# Patient Record
Sex: MALE | Race: BLACK OR AFRICAN AMERICAN | NOT HISPANIC OR LATINO | Employment: FULL TIME | URBAN - METROPOLITAN AREA
[De-identification: names, ages, dates, MRNs, and addresses within clinical notes are randomized per-mention and may not be internally consistent; named-entity substitution may affect disease eponyms.]

---

## 2017-09-26 ENCOUNTER — OFFICE VISIT (OUTPATIENT)
Dept: INTERNAL MEDICINE | Facility: CLINIC | Age: 37
End: 2017-09-26
Payer: COMMERCIAL

## 2017-09-26 VITALS
HEART RATE: 82 BPM | SYSTOLIC BLOOD PRESSURE: 118 MMHG | TEMPERATURE: 99 F | OXYGEN SATURATION: 96 % | BODY MASS INDEX: 34.4 KG/M2 | DIASTOLIC BLOOD PRESSURE: 70 MMHG | HEIGHT: 70 IN | WEIGHT: 240.31 LBS

## 2017-09-26 DIAGNOSIS — Z00.00 ROUTINE GENERAL MEDICAL EXAMINATION AT A HEALTH CARE FACILITY: Primary | ICD-10-CM

## 2017-09-26 DIAGNOSIS — M25.562 CHRONIC PAIN OF LEFT KNEE: ICD-10-CM

## 2017-09-26 DIAGNOSIS — K21.9 GASTROESOPHAGEAL REFLUX DISEASE, ESOPHAGITIS PRESENCE NOT SPECIFIED: ICD-10-CM

## 2017-09-26 DIAGNOSIS — G89.29 CHRONIC PAIN OF LEFT KNEE: ICD-10-CM

## 2017-09-26 PROCEDURE — 99385 PREV VISIT NEW AGE 18-39: CPT | Mod: S$GLB,,, | Performed by: INTERNAL MEDICINE

## 2017-09-26 PROCEDURE — 99999 PR PBB SHADOW E&M-EST. PATIENT-LVL III: CPT | Mod: PBBFAC,,, | Performed by: INTERNAL MEDICINE

## 2017-09-26 RX ORDER — ESOMEPRAZOLE MAGNESIUM 40 MG/1
40 CAPSULE, DELAYED RELEASE ORAL
Qty: 30 CAPSULE | Refills: 11 | Status: SHIPPED | OUTPATIENT
Start: 2017-09-26 | End: 2017-11-30 | Stop reason: SDUPTHER

## 2017-09-26 NOTE — PROGRESS NOTES
"HPI:  Patient is a 37-year-old man who comes in today for his initial visit myself to establish care.  Patient has a long history of esophageal reflux disease.  He states been about 20 years.  He currently uses when necessary over-the-counter Nexium.  Last time he had any formal evaluation was about 10 years ago.  Patient denies any trouble swallowing.  He otherwise has chronic left knee pain, also for about 15 years.  He denies any trauma or injury to the knee.  He states that whenever he is in a prolonged sitting position It begins to hurt.  He otherwise is doing well and has no other complaints      Current MEDS: medcard review, verified and update  Allergies: Per the electronic medical record    Past Medical History:   Diagnosis Date    GERD (gastroesophageal reflux disease)        History reviewed. No pertinent surgical history.    SHx: per the electronic medical record    FHx: recorded in the electronic medical record    ROS:    denies any chest pains or shortness of breath. Denies any nausea, vomiting or diarrhea. Denies any fever, chills or sweats. Denies any change in weight, voice, stool, skin or hair. Denies any dysuria, dyspepsia or dysphagia. Denies any change in vision, hearing or headaches. Denies any swollen lymph nodes or loss of memory.    PE:  /70   Pulse 82   Temp 98.5 °F (36.9 °C) (Tympanic)   Ht 5' 9.5" (1.765 m)   Wt 109 kg (240 lb 4.8 oz)   SpO2 96%   BMI 34.98 kg/m²   Gen: Well-developed, well-nourished, male, in no acute distress, oriented x3  HEENT: neck is supple, no adenopathy, carotids 2+ equal without bruits, thyroid exam normal size without nodules.  CHEST: clear to auscultation and percussion  CVS: regular rate and rhythm without significant murmur, gallop, or rubs  ABD: soft, benign, no rebound no guarding, no distention.  Bowel sounds are normal.     nontender.  No palpable masses.  No organomegaly and no audible bruits.  Joint: His left knee has no laxity in it.  " There is no effusion, no warmth or erythema.  He has full range of motion        Impression:  GERD ×20 years, currently symptomatic  Left knee pain, chronic  Patient Active Problem List   Diagnosis    GERD (gastroesophageal reflux disease)       Plan:   Orders Placed This Encounter    CBC auto differential    Comprehensive metabolic panel    Lipid panel    TSH    Ambulatory consult to Orthopedics    esomeprazole (NEXIUM) 40 MG capsule     He was started on Nexium 40 mg daily.  We discussed other means to help control his reflux disease.  Patient was told that if he is not 95% better in 2 months, he needs to call me.  He'll have the above lab work done and we will call him with results.  He was referred to see an orthopedist for his knee.

## 2017-10-04 ENCOUNTER — OFFICE VISIT (OUTPATIENT)
Dept: INTERNAL MEDICINE | Facility: CLINIC | Age: 37
End: 2017-10-04
Payer: COMMERCIAL

## 2017-10-04 ENCOUNTER — LAB VISIT (OUTPATIENT)
Dept: LAB | Facility: HOSPITAL | Age: 37
End: 2017-10-04
Attending: INTERNAL MEDICINE
Payer: COMMERCIAL

## 2017-10-04 VITALS
DIASTOLIC BLOOD PRESSURE: 68 MMHG | OXYGEN SATURATION: 96 % | SYSTOLIC BLOOD PRESSURE: 115 MMHG | WEIGHT: 238.56 LBS | HEIGHT: 70 IN | BODY MASS INDEX: 34.15 KG/M2 | TEMPERATURE: 99 F | HEART RATE: 84 BPM

## 2017-10-04 DIAGNOSIS — Z00.00 ROUTINE GENERAL MEDICAL EXAMINATION AT A HEALTH CARE FACILITY: ICD-10-CM

## 2017-10-04 DIAGNOSIS — H66.92 LEFT OTITIS MEDIA, UNSPECIFIED OTITIS MEDIA TYPE: ICD-10-CM

## 2017-10-04 DIAGNOSIS — J02.9 PHARYNGITIS, UNSPECIFIED ETIOLOGY: Primary | ICD-10-CM

## 2017-10-04 LAB
ALBUMIN SERPL BCP-MCNC: 3.9 G/DL
ALP SERPL-CCNC: 78 U/L
ALT SERPL W/O P-5'-P-CCNC: 26 U/L
ANION GAP SERPL CALC-SCNC: 8 MMOL/L
AST SERPL-CCNC: 34 U/L
BASOPHILS # BLD AUTO: 0.01 K/UL
BASOPHILS NFR BLD: 0.1 %
BILIRUB SERPL-MCNC: 0.7 MG/DL
BUN SERPL-MCNC: 14 MG/DL
CALCIUM SERPL-MCNC: 9.3 MG/DL
CHLORIDE SERPL-SCNC: 102 MMOL/L
CHOLEST SERPL-MCNC: 173 MG/DL
CHOLEST/HDLC SERPL: 4.4 {RATIO}
CO2 SERPL-SCNC: 26 MMOL/L
CREAT SERPL-MCNC: 1.1 MG/DL
CTP QC/QA: YES
DIFFERENTIAL METHOD: NORMAL
EOSINOPHIL # BLD AUTO: 0.1 K/UL
EOSINOPHIL NFR BLD: 0.6 %
ERYTHROCYTE [DISTWIDTH] IN BLOOD BY AUTOMATED COUNT: 13 %
EST. GFR  (AFRICAN AMERICAN): >60 ML/MIN/1.73 M^2
EST. GFR  (NON AFRICAN AMERICAN): >60 ML/MIN/1.73 M^2
GLUCOSE SERPL-MCNC: 82 MG/DL
HCT VFR BLD AUTO: 44.2 %
HDLC SERPL-MCNC: 39 MG/DL
HDLC SERPL: 22.5 %
HGB BLD-MCNC: 14.8 G/DL
LDLC SERPL CALC-MCNC: 117.4 MG/DL
LYMPHOCYTES # BLD AUTO: 2.9 K/UL
LYMPHOCYTES NFR BLD: 25.6 %
MCH RBC QN AUTO: 30 PG
MCHC RBC AUTO-ENTMCNC: 33.5 G/DL
MCV RBC AUTO: 90 FL
MONOCYTES # BLD AUTO: 0.8 K/UL
MONOCYTES NFR BLD: 7 %
NEUTROPHILS # BLD AUTO: 7.7 K/UL
NEUTROPHILS NFR BLD: 66.5 %
NONHDLC SERPL-MCNC: 134 MG/DL
PLATELET # BLD AUTO: 283 K/UL
PMV BLD AUTO: 11 FL
POTASSIUM SERPL-SCNC: 4.1 MMOL/L
PROT SERPL-MCNC: 8.1 G/DL
RBC # BLD AUTO: 4.93 M/UL
S PYO RRNA THROAT QL PROBE: NEGATIVE
SODIUM SERPL-SCNC: 136 MMOL/L
TRIGL SERPL-MCNC: 83 MG/DL
TSH SERPL DL<=0.005 MIU/L-ACNC: 1.69 UIU/ML
WBC # BLD AUTO: 11.5 K/UL

## 2017-10-04 PROCEDURE — 36415 COLL VENOUS BLD VENIPUNCTURE: CPT | Mod: PO

## 2017-10-04 PROCEDURE — 99213 OFFICE O/P EST LOW 20 MIN: CPT | Mod: S$GLB,,, | Performed by: FAMILY MEDICINE

## 2017-10-04 PROCEDURE — 99999 PR PBB SHADOW E&M-EST. PATIENT-LVL IV: CPT | Mod: PBBFAC,,, | Performed by: FAMILY MEDICINE

## 2017-10-04 PROCEDURE — 84443 ASSAY THYROID STIM HORMONE: CPT

## 2017-10-04 PROCEDURE — 80053 COMPREHEN METABOLIC PANEL: CPT

## 2017-10-04 PROCEDURE — 85025 COMPLETE CBC W/AUTO DIFF WBC: CPT

## 2017-10-04 PROCEDURE — 87147 CULTURE TYPE IMMUNOLOGIC: CPT

## 2017-10-04 PROCEDURE — 80061 LIPID PANEL: CPT

## 2017-10-04 PROCEDURE — 87081 CULTURE SCREEN ONLY: CPT

## 2017-10-04 PROCEDURE — 87880 STREP A ASSAY W/OPTIC: CPT | Mod: QW,S$GLB,, | Performed by: FAMILY MEDICINE

## 2017-10-04 RX ORDER — AMOXICILLIN AND CLAVULANATE POTASSIUM 875; 125 MG/1; MG/1
1 TABLET, FILM COATED ORAL 2 TIMES DAILY
Qty: 20 TABLET | Refills: 0 | Status: SHIPPED | OUTPATIENT
Start: 2017-10-04 | End: 2017-10-14

## 2017-10-04 NOTE — PROGRESS NOTES
"Subjective:       Patient ID: Manolo Osman is a 37 y.o. male.    Chief Complaint: Fever; Nasal Congestion; and Sore Throat      Patient complaining of congestion and bad sore throat which started yesterday. Took mucinex this morning which helped somewhat. Son had strep throat last week.      Fever    Associated symptoms include congestion and a sore throat. Pertinent negatives include no abdominal pain, coughing or nausea.   Sore Throat    Associated symptoms include congestion. Pertinent negatives include no abdominal pain, coughing or shortness of breath.     Review of Systems   Constitutional: Positive for appetite change and fatigue. Negative for fever.   HENT: Positive for congestion, facial swelling, sinus pain, sinus pressure and sore throat. Negative for postnasal drip.    Eyes: Negative for pain and visual disturbance.   Respiratory: Negative for cough and shortness of breath.    Gastrointestinal: Negative for abdominal pain and nausea.     Past Medical History:   Diagnosis Date    GERD (gastroesophageal reflux disease)      History reviewed. No pertinent surgical history.  History reviewed. No pertinent family history.  Social History     Social History    Marital status:      Spouse name: N/A    Number of children: N/A    Years of education: N/A     Occupational History    Not on file.     Social History Main Topics    Smoking status: Former Smoker    Smokeless tobacco: Never Used    Alcohol use Yes    Drug use: No    Sexual activity: Yes     Partners: Female     Other Topics Concern    Not on file     Social History Narrative    No narrative on file     Review of patient's allergies indicates:  No Known Allergies    Objective:       /68 (BP Location: Left arm, Patient Position: Sitting, BP Method: Large (Automatic))   Pulse 84   Temp 99.1 °F (37.3 °C) (Tympanic)   Ht 5' 9.5" (1.765 m)   Wt 108.2 kg (238 lb 8.6 oz)   SpO2 96%   BMI 34.72 kg/m²   Physical Exam "   Constitutional: He appears well-developed and well-nourished. No distress.   HENT:   Head: Normocephalic and atraumatic.   Right Ear: Hearing, tympanic membrane, external ear and ear canal normal.   Left Ear: Hearing, external ear and ear canal normal. Tympanic membrane is erythematous and bulging.   Nose: Mucosal edema present. Right sinus exhibits no maxillary sinus tenderness and no frontal sinus tenderness. Left sinus exhibits no maxillary sinus tenderness and no frontal sinus tenderness.   Mouth/Throat: Uvula is midline and mucous membranes are normal. Posterior oropharyngeal edema and posterior oropharyngeal erythema present.   Eyes: Conjunctivae and EOM are normal. Pupils are equal, round, and reactive to light.   Neck: No thyromegaly present.   Cardiovascular: Normal rate, regular rhythm and normal heart sounds.    Pulmonary/Chest: Effort normal and breath sounds normal. No respiratory distress.   Lymphadenopathy:     He has no cervical adenopathy.   Skin: He is not diaphoretic.   Psychiatric: He has a normal mood and affect. His behavior is normal.   Nursing note and vitals reviewed.    Assessment:     1. Pharyngitis, unspecified etiology    2. Left otitis media, unspecified otitis media type      Plan:   Pharyngitis, unspecified etiology  -     POCT Rapid Strep A  -     Strep A culture, throat    Left otitis media, unspecified otitis media type    Other orders  -     amoxicillin-clavulanate 875-125mg (AUGMENTIN) 875-125 mg per tablet; Take 1 tablet by mouth 2 (two) times daily.  Dispense: 20 tablet; Refill: 0      Medication List with Changes/Refills   New Medications    AMOXICILLIN-CLAVULANATE 875-125MG (AUGMENTIN) 875-125 MG PER TABLET    Take 1 tablet by mouth 2 (two) times daily.   Current Medications    ESOMEPRAZOLE (NEXIUM) 40 MG CAPSULE    Take 1 capsule (40 mg total) by mouth before breakfast.

## 2017-10-07 LAB
BACTERIA THROAT CULT: NORMAL
BACTERIA THROAT CULT: NORMAL

## 2017-11-30 RX ORDER — ESOMEPRAZOLE MAGNESIUM 40 MG/1
40 CAPSULE, DELAYED RELEASE ORAL
Qty: 30 CAPSULE | Refills: 11 | Status: SHIPPED | OUTPATIENT
Start: 2017-11-30 | End: 2017-12-13 | Stop reason: SDUPTHER

## 2017-12-13 RX ORDER — ESOMEPRAZOLE MAGNESIUM 40 MG/1
40 CAPSULE, DELAYED RELEASE ORAL
Qty: 90 CAPSULE | Refills: 3 | Status: SHIPPED | OUTPATIENT
Start: 2017-12-13 | End: 2018-12-13

## 2017-12-19 ENCOUNTER — OFFICE VISIT (OUTPATIENT)
Dept: UROLOGY | Facility: CLINIC | Age: 37
End: 2017-12-19
Payer: COMMERCIAL

## 2017-12-19 ENCOUNTER — LAB VISIT (OUTPATIENT)
Dept: LAB | Facility: HOSPITAL | Age: 37
End: 2017-12-19
Attending: UROLOGY
Payer: COMMERCIAL

## 2017-12-19 VITALS
WEIGHT: 238.56 LBS | HEART RATE: 90 BPM | DIASTOLIC BLOOD PRESSURE: 86 MMHG | SYSTOLIC BLOOD PRESSURE: 122 MMHG | HEIGHT: 70 IN | BODY MASS INDEX: 34.15 KG/M2

## 2017-12-19 DIAGNOSIS — R31.9 HEMATURIA, UNSPECIFIED TYPE: ICD-10-CM

## 2017-12-19 DIAGNOSIS — R31.9 HEMATURIA, UNSPECIFIED TYPE: Primary | ICD-10-CM

## 2017-12-19 LAB
ANION GAP SERPL CALC-SCNC: 8 MMOL/L
BILIRUB SERPL-MCNC: NORMAL MG/DL
BLOOD URINE, POC: NORMAL
BUN SERPL-MCNC: 12 MG/DL
CALCIUM SERPL-MCNC: 9.5 MG/DL
CHLORIDE SERPL-SCNC: 103 MMOL/L
CO2 SERPL-SCNC: 28 MMOL/L
COLOR, POC UA: YELLOW
CREAT SERPL-MCNC: 1 MG/DL
EST. GFR  (AFRICAN AMERICAN): >60 ML/MIN/1.73 M^2
EST. GFR  (NON AFRICAN AMERICAN): >60 ML/MIN/1.73 M^2
GLUCOSE SERPL-MCNC: 87 MG/DL
GLUCOSE UR QL STRIP: NORMAL
KETONES UR QL STRIP: NORMAL
LEUKOCYTE ESTERASE URINE, POC: NORMAL
NITRITE, POC UA: NORMAL
PH, POC UA: 5
POTASSIUM SERPL-SCNC: 4.1 MMOL/L
PROTEIN, POC: NORMAL
SODIUM SERPL-SCNC: 139 MMOL/L
SPECIFIC GRAVITY, POC UA: 1.02
UROBILINOGEN, POC UA: NORMAL

## 2017-12-19 PROCEDURE — 81002 URINALYSIS NONAUTO W/O SCOPE: CPT | Mod: S$GLB,,, | Performed by: UROLOGY

## 2017-12-19 PROCEDURE — 36415 COLL VENOUS BLD VENIPUNCTURE: CPT

## 2017-12-19 PROCEDURE — 80048 BASIC METABOLIC PNL TOTAL CA: CPT

## 2017-12-19 PROCEDURE — 99999 PR PBB SHADOW E&M-EST. PATIENT-LVL II: CPT | Mod: PBBFAC,,, | Performed by: UROLOGY

## 2017-12-19 PROCEDURE — 99204 OFFICE O/P NEW MOD 45 MIN: CPT | Mod: 25,S$GLB,, | Performed by: UROLOGY

## 2017-12-19 NOTE — PROGRESS NOTES
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  Last workup >5 yrs ago.  No abd/pelvic pain and no exac/rel factors.  No gross hematuria.  Had urolithiasis in the past maybe.  No urinary bother.  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  Dipstick hematuria today.    Allergies:  Patient has no known allergies.    Medications:  has a current medication list which includes the following prescription(s): esomeprazole.    Review of Systems:  General: No fever, chills, fatigability, or weight loss.  Skin: No rashes, itching, or changes in color or texture of skin.  Chest: Denies GRUBER, cyanosis, wheezing, cough, and sputum production.  Abdomen: Appetite fine. No weight loss. Denies diarrhea, abdominal pain, hematemesis, or blood in stool.  Musculoskeletal: No joint stiffness or swelling. Denies back pain.  : As above.  All other review of systems negative.    PMH:   has a past medical history of GERD (gastroesophageal reflux disease).    PSH:   has no past surgical history on file.    FamHx: family history is not on file.    SocHx:  reports that he has quit smoking. He has never used smokeless tobacco. He reports that he drinks alcohol. He reports that he does not use drugs.      Physical Exam:  Vitals:    12/19/17 1608   BP: 122/86   Pulse: 90     General: A&Ox3, no apparent distress, no deformities  Neck: No masses, normal thyroid  Lungs: normal inspiration, no use of accessory muscles  Heart: normal pulse, no arrhythmias  Abdomen: Soft, NT, ND, no masses, no hernias, no hepatosplenomegaly  Lymphatic: Neck and groin nodes negative  Skin: The skin is warm and dry. No jaundice.  Ext: No c/c/e.  : Test desc javier, no abnormalities of epididymus. Penis normal, with normal penile and scrotal skin. Meatus normal.     Labs/Studies:   Urinalysis performed in clinic, summary: UA normal exc 50 blood    Impression/Plan:   1. UA/UCx to screen for UTI, but not  likely.  Will get CT Urogram and RTC cysto.

## 2018-01-05 ENCOUNTER — TELEPHONE (OUTPATIENT)
Dept: RADIOLOGY | Facility: HOSPITAL | Age: 38
End: 2018-01-05

## 2018-01-08 ENCOUNTER — HOSPITAL ENCOUNTER (OUTPATIENT)
Dept: RADIOLOGY | Facility: HOSPITAL | Age: 38
Discharge: HOME | End: 2018-01-08
Attending: UROLOGY
Payer: COMMERCIAL

## 2018-01-08 DIAGNOSIS — R31.9 HEMATURIA, UNSPECIFIED TYPE: ICD-10-CM

## 2018-01-08 PROCEDURE — 74178 CT ABD&PLV WO CNTR FLWD CNTR: CPT | Mod: 26,,, | Performed by: RADIOLOGY

## 2018-01-08 PROCEDURE — 74178 CT ABD&PLV WO CNTR FLWD CNTR: CPT | Mod: TC,PO

## 2018-01-08 PROCEDURE — 25500020 PHARM REV CODE 255: Mod: PO | Performed by: UROLOGY

## 2018-01-08 RX ADMIN — IOHEXOL 100 ML: 350 INJECTION, SOLUTION INTRAVENOUS at 03:01

## 2018-01-10 ENCOUNTER — PATIENT MESSAGE (OUTPATIENT)
Dept: UROLOGY | Facility: CLINIC | Age: 38
End: 2018-01-10

## 2018-01-15 ENCOUNTER — OFFICE VISIT (OUTPATIENT)
Dept: UROLOGY | Facility: CLINIC | Age: 38
End: 2018-01-15
Payer: COMMERCIAL

## 2018-01-15 VITALS — WEIGHT: 238 LBS | BODY MASS INDEX: 34.64 KG/M2

## 2018-01-15 DIAGNOSIS — R31.9 HEMATURIA, UNSPECIFIED TYPE: Primary | ICD-10-CM

## 2018-01-15 LAB
BILIRUB SERPL-MCNC: NORMAL MG/DL
BLOOD URINE, POC: 50
COLOR, POC UA: NORMAL
GLUCOSE UR QL STRIP: NORMAL
KETONES UR QL STRIP: NORMAL
LEUKOCYTE ESTERASE URINE, POC: NORMAL
NITRITE, POC UA: NORMAL
PH, POC UA: 7
PROTEIN, POC: NORMAL
SPECIFIC GRAVITY, POC UA: 1.01
UROBILINOGEN, POC UA: NORMAL

## 2018-01-15 PROCEDURE — 99499 UNLISTED E&M SERVICE: CPT | Mod: S$GLB,,, | Performed by: UROLOGY

## 2018-01-15 PROCEDURE — 99999 PR PBB SHADOW E&M-EST. PATIENT-LVL II: CPT | Mod: PBBFAC,,, | Performed by: UROLOGY

## 2018-01-15 PROCEDURE — 81002 URINALYSIS NONAUTO W/O SCOPE: CPT | Mod: S$GLB,,, | Performed by: UROLOGY

## 2018-01-15 PROCEDURE — 52000 CYSTOURETHROSCOPY: CPT | Mod: S$GLB,,, | Performed by: UROLOGY

## 2018-01-15 NOTE — PROGRESS NOTES
Chief Complaint: Microhematuria    HPI:   1/15/18: Cysto today normal.  Right renal cyst to the renal pelvis not obstructing.  No stones or masses seen on CT Urogram.   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  Last workup >5 yrs ago.  No abd/pelvic pain and no exac/rel factors.  No gross hematuria.  Had urolithiasis in the past maybe.  No urinary bother.  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  Dipstick hematuria today.    Allergies:  Patient has no known allergies.    Medications:  has a current medication list which includes the following prescription(s): esomeprazole.    Review of Systems:  General: No fever, chills, fatigability, or weight loss.  Skin: No rashes, itching, or changes in color or texture of skin.  Chest: Denies GRUBER, cyanosis, wheezing, cough, and sputum production.  Abdomen: Appetite fine. No weight loss. Denies diarrhea, abdominal pain, hematemesis, or blood in stool.  Musculoskeletal: No joint stiffness or swelling. Denies back pain.  : As above.  All other review of systems negative.    PMH:   has a past medical history of GERD (gastroesophageal reflux disease).    PSH:   has no past surgical history on file.    FamHx: family history is not on file.    SocHx:  reports that he has quit smoking. He has never used smokeless tobacco. He reports that he drinks alcohol. He reports that he does not use drugs.      Physical Exam:  There were no vitals filed for this visit.  General: A&Ox3, no apparent distress, no deformities  Neck: No masses, normal thyroid  Lungs: normal inspiration, no use of accessory muscles  Heart: normal pulse, no arrhythmias  Abdomen: Soft, NT, ND  Skin: The skin is warm and dry. No jaundice.  Ext: No c/c/e.  :   12/19/17: Test desc javier, no abnormalities of epididymus. Penis normal, with normal penile and scrotal skin. Meatus normal.     Labs/Studies:   Urinalysis performed in clinic, summary: UA normal exc 50 blood    Procedure:  Diagnostic Cystoscopy    Procedure in Detail: After proper consents were obtained, the patient was prepped and draped in normal sterile fashion for diagnostic cystoscopy. 5 ml of lidocaine jelly was instilled in the urethra. The flexible cystoscope was then introduced into the urethra, and advanced into the bladder under direct vision. The urethral mucosa appeared normal, and no strictures were noted. The sphincter appeared to be normal, and the veru montanum was unremarkable. The prostatic mucosa and the lateral lobes of the prostate were normal. The bladder neck was normal. Inspection of the interior of the bladder was then carried out. The trigone was unremarkable, with no mucosal lesions. The ureteral orifices were normal in position and configuration. Systematic inspection of the mucosa of the bladder it was then carried out, rotating the cystoscope so that all areas of the left and right lateral walls, the dome of the bladder, and the posterior wall were all visualized. The cystoscope was then advanced further into the bladder, and maximum deflection of the scope was performed so that the bladder neck could be inspected. No mucosal lesions were noted there. The cystoscope was then removed, and the procedure terminated.     Findings: normal cysto    Impression/Plan:   1. Negative hematuria workup.  US/RTC 1 year.

## 2021-03-05 ENCOUNTER — PATIENT MESSAGE (OUTPATIENT)
Dept: GASTROENTEROLOGY | Facility: CLINIC | Age: 41
End: 2021-03-05

## 2021-03-05 ENCOUNTER — OFFICE VISIT (OUTPATIENT)
Dept: GASTROENTEROLOGY | Facility: CLINIC | Age: 41
End: 2021-03-05
Payer: COMMERCIAL

## 2021-03-05 DIAGNOSIS — K21.9 GASTROESOPHAGEAL REFLUX DISEASE WITHOUT ESOPHAGITIS: ICD-10-CM

## 2021-03-05 PROCEDURE — 99204 OFFICE O/P NEW MOD 45 MIN: CPT | Mod: 95,,, | Performed by: INTERNAL MEDICINE

## 2021-03-05 PROCEDURE — 99204 PR OFFICE/OUTPT VISIT, NEW, LEVL IV, 45-59 MIN: ICD-10-PCS | Mod: 95,,, | Performed by: INTERNAL MEDICINE

## 2021-03-05 RX ORDER — ESOMEPRAZOLE MAGNESIUM 40 MG/1
40 CAPSULE, DELAYED RELEASE ORAL
Qty: 90 CAPSULE | Refills: 0 | Status: SHIPPED | OUTPATIENT
Start: 2021-03-05 | End: 2021-06-03

## 2021-03-05 RX ORDER — ESOMEPRAZOLE MAGNESIUM 40 MG/1
40 CAPSULE, DELAYED RELEASE ORAL
Qty: 30 CAPSULE | Refills: 11 | Status: SHIPPED | OUTPATIENT
Start: 2021-03-05 | End: 2023-04-14

## 2022-05-06 ENCOUNTER — OFFICE VISIT (OUTPATIENT)
Dept: OTOLARYNGOLOGY | Facility: CLINIC | Age: 42
End: 2022-05-06
Payer: COMMERCIAL

## 2022-05-06 DIAGNOSIS — J32.9 CHRONIC SINUSITIS, UNSPECIFIED LOCATION: Primary | ICD-10-CM

## 2022-05-06 PROCEDURE — 99203 PR OFFICE/OUTPT VISIT, NEW, LEVL III, 30-44 MIN: ICD-10-PCS | Mod: 95,,, | Performed by: STUDENT IN AN ORGANIZED HEALTH CARE EDUCATION/TRAINING PROGRAM

## 2022-05-06 PROCEDURE — 99203 OFFICE O/P NEW LOW 30 MIN: CPT | Mod: 95,,, | Performed by: STUDENT IN AN ORGANIZED HEALTH CARE EDUCATION/TRAINING PROGRAM

## 2022-05-06 RX ORDER — LEVOFLOXACIN 500 MG/1
500 TABLET, FILM COATED ORAL DAILY
Qty: 14 TABLET | Refills: 0 | Status: SHIPPED | OUTPATIENT
Start: 2022-05-06 | End: 2022-05-12 | Stop reason: SDUPTHER

## 2022-05-06 RX ORDER — PREDNISONE 10 MG/1
10 TABLET ORAL DAILY
Qty: 18 TABLET | Refills: 0 | Status: SHIPPED | OUTPATIENT
Start: 2022-05-06 | End: 2022-05-12 | Stop reason: SDUPTHER

## 2022-05-06 NOTE — PROGRESS NOTES
Chief complaint:  No chief complaint on file.     Referring Provider:  No referring provider defined for this encounter.    History of present illness:     Mr. Osman is a 42 y.o. presenting for evaluation of sinus issues.       Frequent anterior rhinorrhea (thick, green, usually left), left retrorbital and forehead pain, left nasal obstruction.    Had a similar episode about 2 months ago. Treated with abx, then got mostly better, but never totally cleared. Now symptoms more severe again over last couple weeks.    No inciting incident.     Denies history of allergies. No treatment.     Denies frequent prior sinus infections.      Prior sinus surgery: no.    Asthma history: No    NSAID/ASA allergy: No     Current smoker:  No       History      Past Medical History:   Past Medical History:   Diagnosis Date    GERD (gastroesophageal reflux disease)          Past Surgical History:No past surgical history on file.      Medications: Medication list reviewed. He  has a current medication list which includes the following prescription(s): esomeprazole, levofloxacin, and prednisone.     Allergies: Review of patient's allergies indicates:  No Known Allergies      Family history: family history is not on file.         Social History          Alcohol use:  reports current alcohol use.            Tobacco:  reports that he has quit smoking. He has never used smokeless tobacco.         Physical Examination     General: No acute distress    Voice: no dysphonia, no dysarthria      Head/Face: Normocephalic, atraumatic    Ears:     · Right ear: No gross deformity    · Left ear: No gross deformity      Nose: No gross deformity or lesions      Mouth/Oropharynx: Lips without any lesions      Neurologic: Moving all extremities without gross abnormality. House-Brackmann 1/6    Assessment/Plan:    Chronic sinusitis, unspecified location     levaquin  steriod taper  Sinus protocol as listed below  Return to clinic 1 month if symptoms  "not resolved       Sinus Protocol     Sinusitis is caused by inflammation creating blockage in the natural drainage pathways of the sinuses.  This "Sinus Protocol" is designed to open, flush out and decrease the inflammation within those pathways.        Day 1-3 (Perform 2x per day)     1.  Afrin (pump spray mist OTC) 2 sprays in each nostril   2. Srinath Med Sinus Wash - Make sure you use distilled water!  3. Fluticasone nasal spray 2 sprays in each nostril        Days 4 - follow up visit (perform 1x per day)     1. Srinath Med Sinus wash  2. Fluticasone nasal spray 2 sprays in each nostril          Thomas Roman MD  [facility]   [facility] - [address]  [place] [address]  P: [phone]  F: [phone]           "

## 2022-05-11 ENCOUNTER — PATIENT MESSAGE (OUTPATIENT)
Dept: OTOLARYNGOLOGY | Facility: CLINIC | Age: 42
End: 2022-05-11
Payer: COMMERCIAL

## 2022-05-12 RX ORDER — PREDNISONE 10 MG/1
10 TABLET ORAL DAILY
Qty: 18 TABLET | Refills: 0 | Status: SHIPPED | OUTPATIENT
Start: 2022-05-12 | End: 2022-08-22 | Stop reason: SDUPTHER

## 2022-05-12 RX ORDER — LEVOFLOXACIN 500 MG/1
500 TABLET, FILM COATED ORAL DAILY
Qty: 14 TABLET | Refills: 0 | Status: SHIPPED | OUTPATIENT
Start: 2022-05-12 | End: 2022-05-26

## 2022-05-12 NOTE — TELEPHONE ENCOUNTER
I will resend to new pharmacy, but please let the patient know they were sent to his listed pharmacy originally:    [id number] - [place], [place] - [address] AT [facility] OF [facility]

## 2022-08-12 ENCOUNTER — TELEPHONE (OUTPATIENT)
Dept: OTOLARYNGOLOGY | Facility: CLINIC | Age: 42
End: 2022-08-12
Payer: COMMERCIAL

## 2022-08-22 ENCOUNTER — OFFICE VISIT (OUTPATIENT)
Dept: OTOLARYNGOLOGY | Facility: CLINIC | Age: 42
End: 2022-08-22
Payer: COMMERCIAL

## 2022-08-22 VITALS — WEIGHT: 270.31 LBS | BODY MASS INDEX: 39.34 KG/M2 | TEMPERATURE: 98 F

## 2022-08-22 DIAGNOSIS — J33.9 NASAL POLYPOSIS: ICD-10-CM

## 2022-08-22 DIAGNOSIS — J32.9 CHRONIC SINUSITIS, UNSPECIFIED LOCATION: Primary | ICD-10-CM

## 2022-08-22 DIAGNOSIS — J30.9 ALLERGIC RHINITIS, UNSPECIFIED SEASONALITY, UNSPECIFIED TRIGGER: ICD-10-CM

## 2022-08-22 PROCEDURE — 1159F PR MEDICATION LIST DOCUMENTED IN MEDICAL RECORD: ICD-10-PCS | Mod: CPTII,S$GLB,, | Performed by: STUDENT IN AN ORGANIZED HEALTH CARE EDUCATION/TRAINING PROGRAM

## 2022-08-22 PROCEDURE — 31231 PR NASAL ENDOSCOPY, DX: ICD-10-PCS | Mod: S$GLB,,, | Performed by: STUDENT IN AN ORGANIZED HEALTH CARE EDUCATION/TRAINING PROGRAM

## 2022-08-22 PROCEDURE — 3008F PR BODY MASS INDEX (BMI) DOCUMENTED: ICD-10-PCS | Mod: CPTII,S$GLB,, | Performed by: STUDENT IN AN ORGANIZED HEALTH CARE EDUCATION/TRAINING PROGRAM

## 2022-08-22 PROCEDURE — 99999 PR PBB SHADOW E&M-EST. PATIENT-LVL III: ICD-10-PCS | Mod: PBBFAC,,, | Performed by: STUDENT IN AN ORGANIZED HEALTH CARE EDUCATION/TRAINING PROGRAM

## 2022-08-22 PROCEDURE — 99214 OFFICE O/P EST MOD 30 MIN: CPT | Mod: 25,S$GLB,, | Performed by: STUDENT IN AN ORGANIZED HEALTH CARE EDUCATION/TRAINING PROGRAM

## 2022-08-22 PROCEDURE — 99999 PR PBB SHADOW E&M-EST. PATIENT-LVL III: CPT | Mod: PBBFAC,,, | Performed by: STUDENT IN AN ORGANIZED HEALTH CARE EDUCATION/TRAINING PROGRAM

## 2022-08-22 PROCEDURE — 31231 NASAL ENDOSCOPY DX: CPT | Mod: S$GLB,,, | Performed by: STUDENT IN AN ORGANIZED HEALTH CARE EDUCATION/TRAINING PROGRAM

## 2022-08-22 PROCEDURE — 99214 PR OFFICE/OUTPT VISIT, EST, LEVL IV, 30-39 MIN: ICD-10-PCS | Mod: 25,S$GLB,, | Performed by: STUDENT IN AN ORGANIZED HEALTH CARE EDUCATION/TRAINING PROGRAM

## 2022-08-22 PROCEDURE — 1159F MED LIST DOCD IN RCRD: CPT | Mod: CPTII,S$GLB,, | Performed by: STUDENT IN AN ORGANIZED HEALTH CARE EDUCATION/TRAINING PROGRAM

## 2022-08-22 PROCEDURE — 3008F BODY MASS INDEX DOCD: CPT | Mod: CPTII,S$GLB,, | Performed by: STUDENT IN AN ORGANIZED HEALTH CARE EDUCATION/TRAINING PROGRAM

## 2022-08-22 RX ORDER — FLUTICASONE PROPIONATE 50 MCG
1 SPRAY, SUSPENSION (ML) NASAL DAILY
Qty: 16 G | Refills: 0 | Status: SHIPPED | OUTPATIENT
Start: 2022-08-22 | End: 2023-04-14

## 2022-08-22 RX ORDER — LEVOFLOXACIN 500 MG/1
500 TABLET, FILM COATED ORAL DAILY
Qty: 21 TABLET | Refills: 0 | Status: SHIPPED | OUTPATIENT
Start: 2022-08-22 | End: 2023-01-23 | Stop reason: SDUPTHER

## 2022-08-22 RX ORDER — PREDNISONE 10 MG/1
10 TABLET ORAL DAILY
Qty: 18 TABLET | Refills: 0 | Status: SHIPPED | OUTPATIENT
Start: 2022-08-22 | End: 2023-04-14

## 2022-08-22 NOTE — PROGRESS NOTES
Chief complaint:  No chief complaint on file.        Referring Provider:  No referring provider defined for this encounter.      History of present illness, 5/6/22:     Mr. Osman is a 42 y.o. presenting for evaluation of sinus issues.     Frequent anterior rhinorrhea (thick, green, usually left), left retrorbital and forehead pain, left nasal obstruction.     Had a similar episode about 2 months ago. Treated with abx, then got mostly better, but never totally cleared. Now symptoms more severe again over last couple weeks.     No inciting incident.      Denies history of allergies. No treatment.      Denies frequent prior sinus infections.       Prior sinus surgery: no.     Asthma history: No     NSAID/ASA allergy: No     Current smoker:  No    Return clinic visit, 8/22/22    Main symptom now is green rhinorrhea, more on right. Thicker in the morning.  Every day for last several months. All day.     A few days of right retro-orbital pain, but not frequent.     Denies nasal obstruction or anosmia.     Tried a nasal spray, but not routinely.     All started shortly after wisdom tooth extraction in May. Has had three rounds of antibiotics. Did not resolved on shorter course of abx.     Denies history of allergies.    History      Past Medical History:   Past Medical History:   Diagnosis Date    GERD (gastroesophageal reflux disease)          Past Surgical History:No past surgical history on file.      Medications: Medication list reviewed. He  has a current medication list which includes the following prescription(s): esomeprazole and prednisone.     Allergies: Review of patient's allergies indicates:  No Known Allergies      Family history: family history is not on file.         Social History          Alcohol use:  reports current alcohol use.            Tobacco:  reports that he has quit smoking. He has never used smokeless tobacco.         Physical Examination      Vitals: There were no vitals taken for this  visit.      General: Well developed, well nourished, well hydrated.     Voice: no dysphonia, no dysarthria      Head/Face: Normocephalic, atraumatic. No scars or lesions. Facial musculature equal.     Eyes: No scleral icterus or conjunctival hemorrhage. EOMI. PERRLA.     Ears:     · Right ear: No gross deformity. EAC is clear of debris and erythema. TM are intact with a pneumatized middle ear. No signs of retraction, fluid or infection.      · Left ear: No gross deformity. EAC is clear of debris and erythema. TM are intact with a pneumatized middle ear. No signs of retraction, fluid or infection.      Nose: No gross deformity or lesions. No purulent discharge. No significant NSD.     Mouth/Oropharynx: Lips without any lesions. No mucosal lesions within the oropharynx. No tonsillar exudate or lesions. Pharyngeal walls symmetrical. Uvula midline. Tongue midline without lesions.     Neurologic: Moving all extremities without gross abnormality.CN II-XII grossly intact. House-Brackmann 1/6. No signs of nystagmus.          Data reviewed      Review of records:      I reviewed records from the referring provider's office visits describing the history, workup, and/or treatment of this problem thus far.         Procedures:    Procedure Note - Rigid Nasal Endoscopy     Surgeon: Thomas Roman MD  Anesthesia: topical oxymetazoline and 4% lidocaine.    Technique: The nose was sprayed with oxymetazoline and 4% lidocaine. With the patient in the upright position, a 2.7mm 30-degree endscope was inserted into the patient's right and left nare.  Where visible, nasal secretions and mucosal crusting were removed with a suction. The overall appearance of the nasal cavity and paranasal sinuses were noted and the findings are described below.     Findings: The nasal septum was intact and was not deviated.  The inferior turbinates were enlarged. There was evidence of nasal polyps from left middle turbinate.  Mucopurulent drainage was noted  at the bilateral middle meatus and sphenoethmoidal recess.       Assessment/Plan:    * No diagnoses found *     Manolo  presents today for initial evaluation.  The patient presents with significant evidence of chronic sinusitis with nasal polypsis.  My recommendation is treatment of chronic rhinosinusitis with a course of oral steroids and prolonged course of antibiotics. The patient will also adhere to an aggressive sinus protocol consisting of a short course of Afrin, and daily Flonase and saline irrigations.. The patient will return in 3-4 weeks after completion of treatment with a CT scan. Will also check IgE and CBC with differential. We discussed the possible need for sinus surgery in the event of persistent sinusitis resistant to medical management. We discussed at length the risk of sinus surgery including, but not limited to, recurrence of disease, bleeding, infection, septal perforation, tooth or cheek numbness, vision changes, orbital injury, CSF leak and changes in smell.  The patient had opportunity to ask questions and I answered all of them to their satisfaction.         Thomas Roman MD  [facility]   [facility] - [address]  [place] [address]  P: [phone]  F: [phone]

## 2022-08-22 NOTE — PATIENT INSTRUCTIONS
"Sinus Protocol     Sinusitis is caused by inflammation creating blockage in the natural drainage pathways of the sinuses.  This "Sinus Protocol" is designed to open, flush out and decrease the inflammation within those pathways.        Day 1-5 (Perform 2x per day)      Afrin (pump spray mist OTC) 2 sprays in each nostril a few minutes before doing the sinus wash, as this will help open up the sinuses  Srinath Med Sinus Wash - Make sure you use distilled water!  Fluticasone nasal spray 2 sprays in each nostril        Days 6 - follow up visit (perform 1x per day)     Srinath Med Sinus wash  Fluticasone nasal spray 2 sprays in each nostril      Also, take the antibiotics and steroids as prescribed.  "

## 2022-09-13 ENCOUNTER — LAB VISIT (OUTPATIENT)
Dept: LAB | Facility: HOSPITAL | Age: 42
End: 2022-09-13
Attending: STUDENT IN AN ORGANIZED HEALTH CARE EDUCATION/TRAINING PROGRAM
Payer: COMMERCIAL

## 2022-09-13 DIAGNOSIS — J33.9 NASAL POLYPOSIS: ICD-10-CM

## 2022-09-13 DIAGNOSIS — J32.9 CHRONIC SINUSITIS, UNSPECIFIED LOCATION: ICD-10-CM

## 2022-09-13 PROCEDURE — 82785 ASSAY OF IGE: CPT | Performed by: STUDENT IN AN ORGANIZED HEALTH CARE EDUCATION/TRAINING PROGRAM

## 2022-09-13 PROCEDURE — 85027 COMPLETE CBC AUTOMATED: CPT | Performed by: STUDENT IN AN ORGANIZED HEALTH CARE EDUCATION/TRAINING PROGRAM

## 2022-09-13 PROCEDURE — 36415 COLL VENOUS BLD VENIPUNCTURE: CPT | Mod: PO | Performed by: STUDENT IN AN ORGANIZED HEALTH CARE EDUCATION/TRAINING PROGRAM

## 2022-09-14 LAB
ERYTHROCYTE [DISTWIDTH] IN BLOOD BY AUTOMATED COUNT: 14.3 % (ref 11.5–14.5)
HCT VFR BLD AUTO: 43.5 % (ref 40–54)
HGB BLD-MCNC: 14 G/DL (ref 14–18)
IGE SERPL-ACNC: 81 IU/ML (ref 0–100)
MCH RBC QN AUTO: 30.1 PG (ref 27–31)
MCHC RBC AUTO-ENTMCNC: 32.2 G/DL (ref 32–36)
MCV RBC AUTO: 94 FL (ref 82–98)
PLATELET # BLD AUTO: 280 K/UL (ref 150–450)
PMV BLD AUTO: 11 FL (ref 9.2–12.9)
RBC # BLD AUTO: 4.65 M/UL (ref 4.6–6.2)
WBC # BLD AUTO: 10.83 K/UL (ref 3.9–12.7)

## 2023-01-18 ENCOUNTER — PATIENT MESSAGE (OUTPATIENT)
Dept: OTOLARYNGOLOGY | Facility: CLINIC | Age: 43
End: 2023-01-18
Payer: COMMERCIAL

## 2023-01-23 ENCOUNTER — OFFICE VISIT (OUTPATIENT)
Dept: OTOLARYNGOLOGY | Facility: CLINIC | Age: 43
End: 2023-01-23
Payer: COMMERCIAL

## 2023-01-23 VITALS — BODY MASS INDEX: 42.74 KG/M2 | TEMPERATURE: 98 F | WEIGHT: 293.63 LBS

## 2023-01-23 DIAGNOSIS — J30.9 ALLERGIC RHINITIS, UNSPECIFIED SEASONALITY, UNSPECIFIED TRIGGER: ICD-10-CM

## 2023-01-23 DIAGNOSIS — J33.9 NASAL POLYPOSIS: ICD-10-CM

## 2023-01-23 DIAGNOSIS — J32.9 CHRONIC SINUSITIS, UNSPECIFIED LOCATION: Primary | ICD-10-CM

## 2023-01-23 PROCEDURE — 3008F PR BODY MASS INDEX (BMI) DOCUMENTED: ICD-10-PCS | Mod: CPTII,S$GLB,, | Performed by: STUDENT IN AN ORGANIZED HEALTH CARE EDUCATION/TRAINING PROGRAM

## 2023-01-23 PROCEDURE — 99999 PR PBB SHADOW E&M-EST. PATIENT-LVL III: CPT | Mod: PBBFAC,,, | Performed by: STUDENT IN AN ORGANIZED HEALTH CARE EDUCATION/TRAINING PROGRAM

## 2023-01-23 PROCEDURE — 1159F MED LIST DOCD IN RCRD: CPT | Mod: CPTII,S$GLB,, | Performed by: STUDENT IN AN ORGANIZED HEALTH CARE EDUCATION/TRAINING PROGRAM

## 2023-01-23 PROCEDURE — 99214 PR OFFICE/OUTPT VISIT, EST, LEVL IV, 30-39 MIN: ICD-10-PCS | Mod: 25,S$GLB,, | Performed by: STUDENT IN AN ORGANIZED HEALTH CARE EDUCATION/TRAINING PROGRAM

## 2023-01-23 PROCEDURE — 1159F PR MEDICATION LIST DOCUMENTED IN MEDICAL RECORD: ICD-10-PCS | Mod: CPTII,S$GLB,, | Performed by: STUDENT IN AN ORGANIZED HEALTH CARE EDUCATION/TRAINING PROGRAM

## 2023-01-23 PROCEDURE — 31231 PR NASAL ENDOSCOPY, DX: ICD-10-PCS | Mod: S$GLB,,, | Performed by: STUDENT IN AN ORGANIZED HEALTH CARE EDUCATION/TRAINING PROGRAM

## 2023-01-23 PROCEDURE — 99999 PR PBB SHADOW E&M-EST. PATIENT-LVL III: ICD-10-PCS | Mod: PBBFAC,,, | Performed by: STUDENT IN AN ORGANIZED HEALTH CARE EDUCATION/TRAINING PROGRAM

## 2023-01-23 PROCEDURE — 3008F BODY MASS INDEX DOCD: CPT | Mod: CPTII,S$GLB,, | Performed by: STUDENT IN AN ORGANIZED HEALTH CARE EDUCATION/TRAINING PROGRAM

## 2023-01-23 PROCEDURE — 99214 OFFICE O/P EST MOD 30 MIN: CPT | Mod: 25,S$GLB,, | Performed by: STUDENT IN AN ORGANIZED HEALTH CARE EDUCATION/TRAINING PROGRAM

## 2023-01-23 PROCEDURE — 31231 NASAL ENDOSCOPY DX: CPT | Mod: S$GLB,,, | Performed by: STUDENT IN AN ORGANIZED HEALTH CARE EDUCATION/TRAINING PROGRAM

## 2023-01-23 RX ORDER — LEVOFLOXACIN 500 MG/1
500 TABLET, FILM COATED ORAL DAILY
Qty: 14 TABLET | Refills: 0 | Status: SHIPPED | OUTPATIENT
Start: 2023-01-23 | End: 2023-02-06

## 2023-01-23 RX ORDER — PREDNISONE 10 MG/1
TABLET ORAL
Qty: 24 TABLET | Refills: 0 | Status: SHIPPED | OUTPATIENT
Start: 2023-01-23 | End: 2023-01-23

## 2023-01-23 RX ORDER — PREDNISONE 10 MG/1
TABLET ORAL
Qty: 24 TABLET | Refills: 0 | Status: SHIPPED | OUTPATIENT
Start: 2023-01-23 | End: 2023-04-14

## 2023-01-23 RX ORDER — LEVOFLOXACIN 500 MG/1
500 TABLET, FILM COATED ORAL DAILY
Qty: 14 TABLET | Refills: 0 | Status: SHIPPED | OUTPATIENT
Start: 2023-01-23 | End: 2023-01-23

## 2023-02-07 ENCOUNTER — PATIENT MESSAGE (OUTPATIENT)
Dept: OTOLARYNGOLOGY | Facility: CLINIC | Age: 43
End: 2023-02-07
Payer: COMMERCIAL

## 2023-02-07 ENCOUNTER — PATIENT MESSAGE (OUTPATIENT)
Dept: PHARMACY | Facility: CLINIC | Age: 43
End: 2023-02-07
Payer: COMMERCIAL

## 2023-04-05 ENCOUNTER — PATIENT MESSAGE (OUTPATIENT)
Dept: OTOLARYNGOLOGY | Facility: CLINIC | Age: 43
End: 2023-04-05
Payer: COMMERCIAL

## 2023-04-14 ENCOUNTER — OFFICE VISIT (OUTPATIENT)
Dept: INTERNAL MEDICINE | Facility: CLINIC | Age: 43
End: 2023-04-14
Payer: COMMERCIAL

## 2023-04-14 ENCOUNTER — HOSPITAL ENCOUNTER (OUTPATIENT)
Dept: RADIOLOGY | Facility: HOSPITAL | Age: 43
Discharge: HOME | End: 2023-04-14
Attending: FAMILY MEDICINE
Payer: COMMERCIAL

## 2023-04-14 VITALS
HEIGHT: 70 IN | BODY MASS INDEX: 42.01 KG/M2 | TEMPERATURE: 99 F | SYSTOLIC BLOOD PRESSURE: 144 MMHG | DIASTOLIC BLOOD PRESSURE: 84 MMHG | HEART RATE: 115 BPM | WEIGHT: 293.44 LBS | OXYGEN SATURATION: 95 %

## 2023-04-14 DIAGNOSIS — Z00.00 ROUTINE ADULT HEALTH MAINTENANCE: Primary | ICD-10-CM

## 2023-04-14 DIAGNOSIS — R53.83 FATIGUE, UNSPECIFIED TYPE: ICD-10-CM

## 2023-04-14 DIAGNOSIS — R06.02 SOB (SHORTNESS OF BREATH): ICD-10-CM

## 2023-04-14 DIAGNOSIS — J32.9 CHRONIC SINUSITIS, UNSPECIFIED LOCATION: ICD-10-CM

## 2023-04-14 PROCEDURE — 99204 PR OFFICE/OUTPT VISIT, NEW, LEVL IV, 45-59 MIN: ICD-10-PCS | Mod: S$GLB,,, | Performed by: FAMILY MEDICINE

## 2023-04-14 PROCEDURE — 99204 OFFICE O/P NEW MOD 45 MIN: CPT | Mod: S$GLB,,, | Performed by: FAMILY MEDICINE

## 2023-04-14 PROCEDURE — 3008F PR BODY MASS INDEX (BMI) DOCUMENTED: ICD-10-PCS | Mod: CPTII,S$GLB,, | Performed by: FAMILY MEDICINE

## 2023-04-14 PROCEDURE — 3044F HG A1C LEVEL LT 7.0%: CPT | Mod: CPTII,S$GLB,, | Performed by: FAMILY MEDICINE

## 2023-04-14 PROCEDURE — 3079F DIAST BP 80-89 MM HG: CPT | Mod: CPTII,S$GLB,, | Performed by: FAMILY MEDICINE

## 2023-04-14 PROCEDURE — 99999 PR PBB SHADOW E&M-EST. PATIENT-LVL IV: ICD-10-PCS | Mod: PBBFAC,,, | Performed by: FAMILY MEDICINE

## 2023-04-14 PROCEDURE — 3077F PR MOST RECENT SYSTOLIC BLOOD PRESSURE >= 140 MM HG: ICD-10-PCS | Mod: CPTII,S$GLB,, | Performed by: FAMILY MEDICINE

## 2023-04-14 PROCEDURE — 71046 X-RAY EXAM CHEST 2 VIEWS: CPT | Mod: TC,PO

## 2023-04-14 PROCEDURE — 3077F SYST BP >= 140 MM HG: CPT | Mod: CPTII,S$GLB,, | Performed by: FAMILY MEDICINE

## 2023-04-14 PROCEDURE — 3008F BODY MASS INDEX DOCD: CPT | Mod: CPTII,S$GLB,, | Performed by: FAMILY MEDICINE

## 2023-04-14 PROCEDURE — 99999 PR PBB SHADOW E&M-EST. PATIENT-LVL IV: CPT | Mod: PBBFAC,,, | Performed by: FAMILY MEDICINE

## 2023-04-14 PROCEDURE — 1159F PR MEDICATION LIST DOCUMENTED IN MEDICAL RECORD: ICD-10-PCS | Mod: CPTII,S$GLB,, | Performed by: FAMILY MEDICINE

## 2023-04-14 PROCEDURE — 3044F PR MOST RECENT HEMOGLOBIN A1C LEVEL <7.0%: ICD-10-PCS | Mod: CPTII,S$GLB,, | Performed by: FAMILY MEDICINE

## 2023-04-14 PROCEDURE — 1159F MED LIST DOCD IN RCRD: CPT | Mod: CPTII,S$GLB,, | Performed by: FAMILY MEDICINE

## 2023-04-14 PROCEDURE — 3079F PR MOST RECENT DIASTOLIC BLOOD PRESSURE 80-89 MM HG: ICD-10-PCS | Mod: CPTII,S$GLB,, | Performed by: FAMILY MEDICINE

## 2023-04-14 PROCEDURE — 71046 X-RAY EXAM CHEST 2 VIEWS: CPT | Mod: 26,,, | Performed by: RADIOLOGY

## 2023-04-14 PROCEDURE — 71046 XR CHEST PA AND LATERAL: ICD-10-PCS | Mod: 26,,, | Performed by: RADIOLOGY

## 2023-04-14 RX ORDER — ESOMEPRAZOLE MAGNESIUM 40 MG/1
40 CAPSULE, DELAYED RELEASE ORAL
Qty: 30 CAPSULE | Refills: 11 | Status: SHIPPED | OUTPATIENT
Start: 2023-04-14 | End: 2024-01-29

## 2023-04-15 NOTE — PROGRESS NOTES
"Subjective:      Patient ID: Manolo Osman is a 42 y.o. male.    Chief Complaint: [facility]      Patient reports recent weight gain, increased SOB.   Reports worsened reflux, has been out of nexium.      Review of Systems   Constitutional:  Positive for appetite change and unexpected weight change. Negative for activity change.   Respiratory:  Positive for shortness of breath. Negative for cough.    Cardiovascular:  Negative for chest pain, palpitations and leg swelling.   Gastrointestinal:  Negative for abdominal pain.   Past Medical History:   Diagnosis Date    GERD (gastroesophageal reflux disease)           History reviewed. No pertinent surgical history.  Family History   Problem Relation Age of Onset    No Known Problems Mother     No Known Problems Father      Social History     Socioeconomic History    Marital status:    Tobacco Use    Smoking status: Every Day     Types: Cigarettes    Smokeless tobacco: Never    Tobacco comments:     Smokes 2 cigarettes a day.   Substance and Sexual Activity    Alcohol use: Yes     Comment: occ    Drug use: No    Sexual activity: Yes     Partners: Female     Birth control/protection: None     Review of patient's allergies indicates:  No Known Allergies    Objective:       BP (!) 144/84 (BP Location: Left arm, Patient Position: Sitting, BP Method: Large (Manual))   Pulse (!) 115   Temp 98.8 °F (37.1 °C) (Tympanic)   Ht 5' 10" (1.778 m)   Wt 133.1 kg (293 lb 6.9 oz)   SpO2 95%   BMI 42.10 kg/m²   Physical Exam  Vitals reviewed.   Constitutional:       General: He is not in acute distress.     Appearance: Normal appearance. He is well-developed. He is not ill-appearing or diaphoretic.   HENT:      Head: Normocephalic.      Right Ear: Hearing, tympanic membrane, ear canal and external ear normal.      Left Ear: Hearing, tympanic membrane, ear canal and external ear normal.      Nose: Nose normal.      Right Sinus: No maxillary sinus tenderness or frontal " sinus tenderness.      Left Sinus: No maxillary sinus tenderness or frontal sinus tenderness.      Mouth/Throat:      Pharynx: Uvula midline. No oropharyngeal exudate.   Eyes:      Conjunctiva/sclera: Conjunctivae normal.      Pupils: Pupils are equal, round, and reactive to light.   Cardiovascular:      Rate and Rhythm: Normal rate and regular rhythm.   Pulmonary:      Effort: Pulmonary effort is normal. No respiratory distress.      Breath sounds: Normal breath sounds.   Abdominal:      General: Bowel sounds are normal.      Palpations: Abdomen is soft.      Tenderness: There is no abdominal tenderness. There is no guarding.      Hernia: No hernia is present.   Musculoskeletal:         General: Normal range of motion.      Cervical back: Normal range of motion and neck supple.      Right lower leg: No edema.      Left lower leg: No edema.   Skin:     General: Skin is warm and dry.      Capillary Refill: Capillary refill takes less than 2 seconds.   Neurological:      General: No focal deficit present.      Mental Status: He is alert and oriented to person, place, and time.   Psychiatric:         Mood and Affect: Mood normal.         Behavior: Behavior normal.         Thought Content: Thought content normal.         Judgment: Judgment normal.     Assessment:     1. Routine adult health maintenance    2. Chronic sinusitis, unspecified location    3. BMI 40.0-44.9, adult    4. SOB (shortness of breath)    5. Fatigue, unspecified type      Plan:   Routine adult health maintenance  -     CBC Auto Differential; Future; Expected date: 04/14/2023  -     Comprehensive Metabolic Panel; Future; Expected date: 04/14/2023  -     TSH; Future; Expected date: 04/14/2023  -     Lipid Panel; Future; Expected date: 04/14/2023  -     Hemoglobin A1C; Future; Expected date: 04/14/2023    Chronic sinusitis, unspecified location    BMI 40.0-44.9, adult    SOB (shortness of breath)  -     X-Ray Chest PA And Lateral; Future; Expected date:  04/14/2023  -     Echo; Future  -     Cancel: Exercise Stress - EKG; Future  -     Exercise Stress - EKG; Future    Fatigue, unspecified type  -     X-Ray Chest PA And Lateral; Future; Expected date: 04/14/2023  -     Echo; Future  -     Cancel: Exercise Stress - EKG; Future  -     Exercise Stress - EKG; Future    Other orders  -     esomeprazole (NEXIUM) 40 MG capsule; Take 1 capsule (40 mg total) by mouth before breakfast.  Dispense: 30 capsule; Refill: 11      Medication List with Changes/Refills   Changed and/or Refilled Medications    Modified Medication Previous Medication    ESOMEPRAZOLE (NEXIUM) 40 MG CAPSULE esomeprazole (NEXIUM) 40 MG capsule       Take 1 capsule (40 mg total) by mouth before breakfast.    Take 1 capsule (40 mg total) by mouth before breakfast.   Discontinued Medications    ESOMEPRAZOLE (NEXIUM) 40 MG CAPSULE    Take 1 capsule (40 mg total) by mouth before breakfast.    ESOMEPRAZOLE (NEXIUM) 40 MG CAPSULE    Take 1 capsule (40 mg total) by mouth before breakfast.    FLUTICASONE PROPIONATE (FLONASE) 50 MCG/ACTUATION NASAL SPRAY    1 spray (50 mcg total) by Each Nostril route once daily.    PREDNISONE (DELTASONE) 10 MG TABLET    Take 1 tablet (10 mg total) by mouth once daily. Take 3 tablets a day for 3 days (1before breakfast, 1 after lunch, 1 before bed) Then take 2 tablets a day for 3 days (1 before breakfast, 1 before bed) Then take 1 tablet a day for 3 days (1 before breakfast)    PREDNISONE (DELTASONE) 10 MG TABLET    Take 3 tablets by mouth a day for 4 days (1 before breakfast, 1 after lunch, 1 before bed). THEN take 2 tablets by mouth a day for 4 days (1 before breakfast, 1 before bed). THEN take 1 tablet by mouth a day for 4 days (1 before breakfast).

## 2023-04-20 ENCOUNTER — PATIENT MESSAGE (OUTPATIENT)
Dept: INTERNAL MEDICINE | Facility: CLINIC | Age: 43
End: 2023-04-20
Payer: COMMERCIAL

## 2023-04-20 ENCOUNTER — HOSPITAL ENCOUNTER (OUTPATIENT)
Dept: CARDIOLOGY | Facility: HOSPITAL | Age: 43
Discharge: HOME | End: 2023-04-20
Attending: FAMILY MEDICINE
Payer: COMMERCIAL

## 2023-04-20 VITALS
SYSTOLIC BLOOD PRESSURE: 144 MMHG | HEIGHT: 70 IN | DIASTOLIC BLOOD PRESSURE: 84 MMHG | WEIGHT: 293 LBS | BODY MASS INDEX: 41.95 KG/M2

## 2023-04-20 DIAGNOSIS — R06.02 SOB (SHORTNESS OF BREATH): ICD-10-CM

## 2023-04-20 DIAGNOSIS — R53.83 FATIGUE, UNSPECIFIED TYPE: ICD-10-CM

## 2023-04-20 LAB
AORTIC ROOT ANNULUS: 3.7 CM
ASCENDING AORTA: 3.38 CM
AV INDEX (PROSTH): 0.97
AV MEAN GRADIENT: 4 MMHG
AV PEAK GRADIENT: 7 MMHG
AV VALVE AREA: 3.73 CM2
AV VELOCITY RATIO: 0.89
BSA FOR ECHO PROCEDURE: 2.56 M2
CV ECHO LV RWT: 0.52 CM
DOP CALC AO PEAK VEL: 1.31 M/S
DOP CALC AO VTI: 21.8 CM
DOP CALC LVOT AREA: 3.8 CM2
DOP CALC LVOT DIAMETER: 2.21 CM
DOP CALC LVOT PEAK VEL: 1.16 M/S
DOP CALC LVOT STROKE VOLUME: 81.28 CM3
DOP CALC RVOT PEAK VEL: 0.8 M/S
DOP CALC RVOT VTI: 14.9 CM
DOP CALCLVOT PEAK VEL VTI: 21.2 CM
E WAVE DECELERATION TIME: 133.31 MSEC
E/A RATIO: 0.77
E/E' RATIO: 5.81 M/S
ECHO LV POSTERIOR WALL: 1.03 CM (ref 0.6–1.1)
EJECTION FRACTION: 60 %
FRACTIONAL SHORTENING: 36 % (ref 28–44)
INTERVENTRICULAR SEPTUM: 1.18 CM (ref 0.6–1.1)
IVC DIAMETER: 1.43 CM
IVRT: 99.9 MSEC
LA MAJOR: 5.87 CM
LA MINOR: 5.14 CM
LA WIDTH: 3.3 CM
LEFT ATRIUM SIZE: 3.79 CM
LEFT ATRIUM VOLUME INDEX MOD: 23.1 ML/M2
LEFT ATRIUM VOLUME INDEX: 23.7 ML/M2
LEFT ATRIUM VOLUME MOD: 56.91 CM3
LEFT ATRIUM VOLUME: 58.27 CM3
LEFT INTERNAL DIMENSION IN SYSTOLE: 2.53 CM (ref 2.1–4)
LEFT VENTRICLE DIASTOLIC VOLUME INDEX: 28 ML/M2
LEFT VENTRICLE DIASTOLIC VOLUME: 68.89 ML
LEFT VENTRICLE MASS INDEX: 59 G/M2
LEFT VENTRICLE SYSTOLIC VOLUME INDEX: 9.3 ML/M2
LEFT VENTRICLE SYSTOLIC VOLUME: 22.89 ML
LEFT VENTRICULAR INTERNAL DIMENSION IN DIASTOLE: 3.97 CM (ref 3.5–6)
LEFT VENTRICULAR MASS: 144.92 G
LV LATERAL E/E' RATIO: 5.08 M/S
LV SEPTAL E/E' RATIO: 6.78 M/S
LVOT MG: 2.85 MMHG
LVOT MV: 0.78 CM/S
MV PEAK A VEL: 0.79 M/S
MV PEAK E VEL: 0.61 M/S
MV STENOSIS PRESSURE HALF TIME: 38.66 MS
MV VALVE AREA P 1/2 METHOD: 5.69 CM2
PISA TR MAX VEL: 2.67 M/S
PULM VEIN S/D RATIO: 1.63
PV MEAN GRADIENT: 1.18 MMHG
PV PEAK D VEL: 0.38 M/S
PV PEAK S VEL: 0.62 M/S
PV PEAK VELOCITY: 1.12 CM/S
RA MAJOR: 4.73 CM
RA PRESSURE: 3 MMHG
RA WIDTH: 3.09 CM
RIGHT VENTRICULAR END-DIASTOLIC DIMENSION: 3.23 CM
SINUS: 3.24 CM
STJ: 2.94 CM
TDI LATERAL: 0.12 M/S
TDI SEPTAL: 0.09 M/S
TDI: 0.11 M/S
TR MAX PG: 29 MMHG
TRICUSPID ANNULAR PLANE SYSTOLIC EXCURSION: 2.09 CM
TV REST PULMONARY ARTERY PRESSURE: 32 MMHG

## 2023-04-20 PROCEDURE — 93306 TTE W/DOPPLER COMPLETE: CPT | Mod: 26,,, | Performed by: INTERNAL MEDICINE

## 2023-04-20 PROCEDURE — 93306 ECHO (CUPID ONLY): ICD-10-PCS | Mod: 26,,, | Performed by: INTERNAL MEDICINE

## 2023-04-20 PROCEDURE — 93306 TTE W/DOPPLER COMPLETE: CPT

## 2023-04-24 ENCOUNTER — PATIENT MESSAGE (OUTPATIENT)
Dept: INTERNAL MEDICINE | Facility: CLINIC | Age: 43
End: 2023-04-24
Payer: COMMERCIAL

## 2023-04-24 ENCOUNTER — PATIENT MESSAGE (OUTPATIENT)
Dept: OTOLARYNGOLOGY | Facility: CLINIC | Age: 43
End: 2023-04-24
Payer: COMMERCIAL

## 2023-04-24 DIAGNOSIS — J32.9 CHRONIC SINUSITIS, UNSPECIFIED LOCATION: Primary | ICD-10-CM

## 2023-04-24 NOTE — TELEPHONE ENCOUNTER
New order placed for external CT.  Dummy date placed in referral so it will get worked for approval.

## 2023-04-26 ENCOUNTER — PATIENT MESSAGE (OUTPATIENT)
Dept: OTOLARYNGOLOGY | Facility: CLINIC | Age: 43
End: 2023-04-26
Payer: COMMERCIAL

## 2023-04-26 ENCOUNTER — TELEPHONE (OUTPATIENT)
Dept: CARDIOLOGY | Facility: HOSPITAL | Age: 43
End: 2023-04-26
Payer: COMMERCIAL

## 2023-05-12 ENCOUNTER — TELEPHONE (OUTPATIENT)
Dept: CARDIOLOGY | Facility: HOSPITAL | Age: 43
End: 2023-05-12
Payer: COMMERCIAL

## 2024-01-09 ENCOUNTER — OFFICE VISIT (OUTPATIENT)
Dept: OTOLARYNGOLOGY | Facility: CLINIC | Age: 44
End: 2024-01-09
Payer: COMMERCIAL

## 2024-01-09 ENCOUNTER — OFFICE VISIT (OUTPATIENT)
Dept: INTERNAL MEDICINE | Facility: CLINIC | Age: 44
End: 2024-01-09
Payer: COMMERCIAL

## 2024-01-09 VITALS
BODY MASS INDEX: 42.83 KG/M2 | WEIGHT: 299.19 LBS | HEIGHT: 70 IN | HEART RATE: 104 BPM | SYSTOLIC BLOOD PRESSURE: 138 MMHG | TEMPERATURE: 99 F | DIASTOLIC BLOOD PRESSURE: 80 MMHG | OXYGEN SATURATION: 98 %

## 2024-01-09 VITALS — BODY MASS INDEX: 42.61 KG/M2 | WEIGHT: 296.94 LBS

## 2024-01-09 DIAGNOSIS — R06.02 SOB (SHORTNESS OF BREATH): ICD-10-CM

## 2024-01-09 DIAGNOSIS — J32.9 CHRONIC SINUSITIS, UNSPECIFIED LOCATION: Primary | ICD-10-CM

## 2024-01-09 DIAGNOSIS — Z00.00 ROUTINE ADULT HEALTH MAINTENANCE: Primary | ICD-10-CM

## 2024-01-09 DIAGNOSIS — R73.03 PREDIABETES: ICD-10-CM

## 2024-01-09 DIAGNOSIS — J33.9 NASAL POLYPOSIS: ICD-10-CM

## 2024-01-09 DIAGNOSIS — J30.9 ALLERGIC RHINITIS, UNSPECIFIED SEASONALITY, UNSPECIFIED TRIGGER: ICD-10-CM

## 2024-01-09 PROCEDURE — 3008F BODY MASS INDEX DOCD: CPT | Mod: CPTII,S$GLB,, | Performed by: FAMILY MEDICINE

## 2024-01-09 PROCEDURE — 31231 NASAL ENDOSCOPY DX: CPT | Mod: S$GLB,,, | Performed by: STUDENT IN AN ORGANIZED HEALTH CARE EDUCATION/TRAINING PROGRAM

## 2024-01-09 PROCEDURE — 1159F MED LIST DOCD IN RCRD: CPT | Mod: CPTII,S$GLB,, | Performed by: STUDENT IN AN ORGANIZED HEALTH CARE EDUCATION/TRAINING PROGRAM

## 2024-01-09 PROCEDURE — 3079F DIAST BP 80-89 MM HG: CPT | Mod: CPTII,S$GLB,, | Performed by: FAMILY MEDICINE

## 2024-01-09 PROCEDURE — 99999 PR PBB SHADOW E&M-EST. PATIENT-LVL III: CPT | Mod: PBBFAC,,, | Performed by: STUDENT IN AN ORGANIZED HEALTH CARE EDUCATION/TRAINING PROGRAM

## 2024-01-09 PROCEDURE — 3008F BODY MASS INDEX DOCD: CPT | Mod: CPTII,S$GLB,, | Performed by: STUDENT IN AN ORGANIZED HEALTH CARE EDUCATION/TRAINING PROGRAM

## 2024-01-09 PROCEDURE — 99396 PREV VISIT EST AGE 40-64: CPT | Mod: S$GLB,,, | Performed by: FAMILY MEDICINE

## 2024-01-09 PROCEDURE — 99214 OFFICE O/P EST MOD 30 MIN: CPT | Mod: 25,S$GLB,, | Performed by: STUDENT IN AN ORGANIZED HEALTH CARE EDUCATION/TRAINING PROGRAM

## 2024-01-09 PROCEDURE — 1159F MED LIST DOCD IN RCRD: CPT | Mod: CPTII,S$GLB,, | Performed by: FAMILY MEDICINE

## 2024-01-09 PROCEDURE — 3075F SYST BP GE 130 - 139MM HG: CPT | Mod: CPTII,S$GLB,, | Performed by: FAMILY MEDICINE

## 2024-01-09 PROCEDURE — 99999 PR PBB SHADOW E&M-EST. PATIENT-LVL III: CPT | Mod: PBBFAC,,, | Performed by: FAMILY MEDICINE

## 2024-01-09 PROCEDURE — 3044F HG A1C LEVEL LT 7.0%: CPT | Mod: CPTII,S$GLB,, | Performed by: FAMILY MEDICINE

## 2024-01-09 RX ORDER — PREDNISONE 10 MG/1
TABLET ORAL
Qty: 24 TABLET | Refills: 0 | Status: SHIPPED | OUTPATIENT
Start: 2024-01-09 | End: 2024-01-29

## 2024-01-09 NOTE — PROGRESS NOTES
Chief complaint:    Chief Complaint   Patient presents with    Sinus Problem     Chronic sinus infections - has not completed sinus CT previously recommended -          Referring Provider:  No referring provider defined for this encounter.      History of present illness, 5/6/22:     Mr. Osman is a 43 y.o. presenting for evaluation of sinus issues.     Frequent anterior rhinorrhea (thick, green, usually left), left retrorbital and forehead pain, left nasal obstruction.     Had a similar episode about 2 months ago. Treated with abx, then got mostly better, but never totally cleared. Now symptoms more severe again over last couple weeks.     No inciting incident.      Denies history of allergies. No treatment.      Denies frequent prior sinus infections.       Prior sinus surgery: no.     Asthma history: No     NSAID/ASA allergy: No     Current smoker:  No    Return clinic visit, 8/22/22    Main symptom now is green rhinorrhea, more on right. Thicker in the morning.  Every day for last several months. All day.     A few days of right retro-orbital pain, but not frequent.     Denies nasal obstruction or anosmia.     Tried a nasal spray, but not routinely.     All started shortly after wisdom tooth extraction in May. Has had three rounds of antibiotics. Did not resolved on shorter course of abx.     Denies history of allergies.      Return clinic visit, 1/23/23    COVID 2 weeks ago. URI symptoms resolved. Since then purulent rhinorrhea, nasal obstruction, hyposmia. Mucinex, tylenol.    Return clinic visit, 1/9/24    Current symptoms in nasal obstruction (bilateral), purulent rhinorrhea, facial pressure, anosmia.   Symptoms present for 2 years now, progressively worsening. Minimal improvement on two rounds of 21 day courses of antibiotics and long course of steroids.   Used saline and flonase for several months. Recently stopped.          History      Past Medical History:   Past Medical History:   Diagnosis Date    GERD  (gastroesophageal reflux disease)          Past Surgical History:No past surgical history on file.      Medications: Medication list reviewed. He  has a current medication list which includes the following prescription(s): esomeprazole and prednisone.     Allergies: Review of patient's allergies indicates:  No Known Allergies      Family history: family history includes No Known Problems in his father and mother.         Social History          Alcohol use:  reports current alcohol use.            Tobacco:  reports that he has been smoking cigarettes. He has never used smokeless tobacco.         Physical Examination      Vitals: Weight 134.7 kg (296 lb 15.4 oz).      General: Well developed, well nourished, well hydrated.     Voice: no dysphonia, no dysarthria      Head/Face: Normocephalic, atraumatic. No scars or lesions. Facial musculature equal.     Eyes: No scleral icterus or conjunctival hemorrhage. EOMI. PERRLA.     Ears:     Right ear: No gross deformity. EAC is clear of debris and erythema. TM are intact with a pneumatized middle ear. No signs of retraction, fluid or infection.      Left ear: No gross deformity. EAC is clear of debris and erythema. TM are intact with a pneumatized middle ear. No signs of retraction, fluid or infection.      Nose: No gross deformity or lesions. Severely congested. See procedure note.    Mouth/Oropharynx: Lips without any lesions. No mucosal lesions within the oropharynx. No tonsillar exudate or lesions. Pharyngeal walls symmetrical. Uvula midline. Tongue midline without lesions.     Neurologic: Moving all extremities without gross abnormality.CN II-XII grossly intact. House-Brackmann 1/6. No signs of nystagmus.          Data reviewed      Review of records:      I reviewed records from the referring provider's office visits describing the history, workup, and/or treatment of this problem thus far.         Procedures:  NASAL ENDOSCOPY       Indication: Manolo Osman is a 43  y.o. male  with sinonasal symptoms that were not able to be explained by anterior rhinoscopy alone, thus necessitating nasal endoscopy.     Procedure: Risks, benefits, and alternatives of the procedure were discussed with the patient, and the patient consented to the nasal endoscopy.  The nasal cavity was sprayed with a topical decongestant and anesthetic (if needed). The endoscope was passed into each nostril and each nasal cavity was visualized.  On each side the nasal cavity, sinuses (if open), turbinates, middle and superior meatus, sphenoethmoidal recess and septum were examined with the findings described below. At the end of the examination, the scope was removed. The patient tolerated the procedure well with no complications.       Endoscopic Sinonasal Exam Findings:  -     The right side has marked edema with polypoid changes, danuta polyposis from middle meatus into nasal cavity  -     The left side has marked edema with polypoid changes, danuta polyposis from middle meatus into nasal cavity  -     Nasal secretions: purulent secretions bilaterally  -     Nasal septum: RIGHT moderate deviation   -     Inferior turbinate: hypertrophy or edema (Moderate) bilaterally  -     Middle turbinate: hypertrophy or edema (Severe) and polypoid changes bilaterally  -     Other findings: none      Assessment/Plan:    1. Chronic sinusitis, unspecified location    2. Nasal polyposis    3. Allergic rhinitis, unspecified seasonality, unspecified trigger         Manolo  presents with significant evidence of chronic sinusitis with nasal polypsis.  He has been treated with 2 prolonged courses of antibiotics and steroids in the last year. The patient has also adhered to an aggressive sinus protocol consisting of daily Flonase and saline irrigations for the last year he discussed continuing this. I recommend CT scan now. We discussed the possible need for sinus surgery in the event of persistent sinusitis resistant to medical  management. We discussed at length the risk of sinus surgery including, but not limited to, recurrence of disease, bleeding, infection, septal perforation, tooth or cheek numbness, vision changes, orbital injury, CSF leak and changes in smell.  The patient had opportunity to ask questions and I answered all of them to their satisfaction.         Thomas Roman MD  [facility]   [facility] - [address]  [place] [id number]  P: [phone]  F: [phone]

## 2024-01-10 ENCOUNTER — PATIENT MESSAGE (OUTPATIENT)
Dept: INTERNAL MEDICINE | Facility: CLINIC | Age: 44
End: 2024-01-10
Payer: COMMERCIAL

## 2024-01-10 ENCOUNTER — HOSPITAL ENCOUNTER (OUTPATIENT)
Dept: CARDIOLOGY | Facility: HOSPITAL | Age: 44
Discharge: HOME | End: 2024-01-10
Attending: FAMILY MEDICINE
Payer: COMMERCIAL

## 2024-01-10 ENCOUNTER — CLINICAL SUPPORT (OUTPATIENT)
Dept: PULMONOLOGY | Facility: CLINIC | Age: 44
End: 2024-01-10
Payer: COMMERCIAL

## 2024-01-10 DIAGNOSIS — R06.02 SOB (SHORTNESS OF BREATH): ICD-10-CM

## 2024-01-10 LAB
BRPFT: ABNORMAL
CV STRESS BASE HR: 101 BPM
DIASTOLIC BLOOD PRESSURE: 77 MMHG
DLCO ADJ PRE: 24.05 ML/(MIN*MMHG) (ref 25.64–39.5)
DLCO SINGLE BREATH LLN: 25.64
DLCO SINGLE BREATH PRE REF: 73.8 %
DLCO SINGLE BREATH REF: 32.57
DLCOC SBVA LLN: 3.34
DLCOC SBVA PRE REF: 125.2 %
DLCOC SBVA REF: 4.56
DLCOC SINGLE BREATH LLN: 25.64
DLCOC SINGLE BREATH PRE REF: 73.8 %
DLCOC SINGLE BREATH REF: 32.57
DLCOVA LLN: 3.34
DLCOVA PRE REF: 125.2 %
DLCOVA PRE: 5.71 ML/(MIN*MMHG*L) (ref 3.34–5.78)
DLCOVA REF: 4.56
DLVAADJ PRE: 5.71 ML/(MIN*MMHG*L) (ref 3.34–5.78)
ERV LLN: -16448.59
ERV PRE REF: 130.1 %
ERV REF: 1.41
FEF 25 75 CHG: 7.9 %
FEF 25 75 LLN: 1.73
FEF 25 75 POST REF: 162.8 %
FEF 25 75 PRE REF: 150.9 %
FEF 25 75 REF: 3.42
FET100 CHG: 5.3 %
FEV1 CHG: 3.8 %
FEV1 FVC CHG: 1.1 %
FEV1 FVC LLN: 71
FEV1 FVC POST REF: 111.7 %
FEV1 FVC PRE REF: 110.5 %
FEV1 FVC REF: 81
FEV1 LLN: 2.69
FEV1 POST REF: 99.2 %
FEV1 PRE REF: 95.6 %
FEV1 REF: 3.52
FRCPLETH LLN: 2.48
FRCPLETH PREREF: 59.2 %
FRCPLETH REF: 3.46
FVC CHG: 2.7 %
FVC LLN: 3.39
FVC POST REF: 88.6 %
FVC PRE REF: 86.3 %
FVC REF: 4.37
IVC PRE: 3 L (ref 3.39–5.36)
IVC SINGLE BREATH LLN: 3.39
IVC SINGLE BREATH PRE REF: 68.5 %
IVC SINGLE BREATH REF: 4.37
MVV LLN: 133
MVV PRE REF: 77.2 %
MVV REF: 157
OHS CV CPX 1 MINUTE RECOVERY HEART RATE: 160 BPM
OHS CV CPX 85 PERCENT MAX PREDICTED HEART RATE MALE: 150
OHS CV CPX ESTIMATED METS: 10
OHS CV CPX MAX PREDICTED HEART RATE: 177
OHS CV CPX PATIENT IS FEMALE: 0
OHS CV CPX PATIENT IS MALE: 1
OHS CV CPX PEAK DIASTOLIC BLOOD PRESSURE: 69 MMHG
OHS CV CPX PEAK HEAR RATE: 173 BPM
OHS CV CPX PEAK RATE PRESSURE PRODUCT: NORMAL
OHS CV CPX PEAK SYSTOLIC BLOOD PRESSURE: 230 MMHG
OHS CV CPX PERCENT MAX PREDICTED HEART RATE ACHIEVED: 98
OHS CV CPX RATE PRESSURE PRODUCT PRESENTING: NORMAL
PEF CHG: 2.8 %
PEF LLN: 6.47
PEF POST REF: 116 %
PEF PRE REF: 112.9 %
PEF REF: 9.13
POST FEF 25 75: 5.57 L/S (ref 1.73–5.11)
POST FET 100: 7.15 SEC
POST FEV1 FVC: 90.12 % (ref 70.53–90.87)
POST FEV1: 3.49 L (ref 2.69–4.35)
POST FVC: 3.88 L (ref 3.39–5.36)
POST PEF: 10.59 L/S (ref 6.47–11.79)
PRE DLCO: 24.05 ML/(MIN*MMHG) (ref 25.64–39.5)
PRE ERV: 1.84 L (ref -16448.59–16451.41)
PRE FEF 25 75: 5.16 L/S (ref 1.73–5.11)
PRE FET 100: 6.79 SEC
PRE FEV1 FVC: 89.16 % (ref 70.53–90.87)
PRE FEV1: 3.37 L (ref 2.69–4.35)
PRE FRC PL: 2.05 L
PRE FVC: 3.77 L (ref 3.39–5.36)
PRE MVV: 121 L/MIN (ref 133.18–180.18)
PRE PEF: 10.3 L/S (ref 6.47–11.79)
PRE RV: 1.29 L (ref 1.37–2.72)
PRE TLC: 5.19 L (ref 5.99–8.29)
RAW LLN: 3.06
RAW PRE REF: 59.9 %
RAW PRE: 1.83 CMH2O*S/L (ref 3.06–3.06)
RAW REF: 3.06
RV LLN: 1.37
RV PRE REF: 62.9 %
RV REF: 2.05
RVTLC LLN: 22
RVTLC PRE REF: 80.8 %
RVTLC PRE: 24.83 % (ref 21.75–39.71)
RVTLC REF: 31
STRESS ECHO POST EXERCISE DUR MIN: 7 MINUTES
STRESS ECHO POST EXERCISE DUR SEC: 12 SECONDS
SYSTOLIC BLOOD PRESSURE: 139 MMHG
TLC LLN: 5.99
TLC PRE REF: 72.6 %
TLC REF: 7.14
VA PRE: 4.21 L (ref 6.99–6.99)
VA SINGLE BREATH LLN: 6.99
VA SINGLE BREATH PRE REF: 60.3 %
VA SINGLE BREATH REF: 6.99
VC LLN: 3.39
VC PRE REF: 89.1 %
VC PRE: 3.9 L (ref 3.39–5.36)
VC REF: 4.37
VTGRAWPRE: 2.19 L

## 2024-01-10 PROCEDURE — 93018 CV STRESS TEST I&R ONLY: CPT | Mod: ,,, | Performed by: INTERNAL MEDICINE

## 2024-01-10 PROCEDURE — 94726 PLETHYSMOGRAPHY LUNG VOLUMES: CPT | Mod: S$GLB,,, | Performed by: INTERNAL MEDICINE

## 2024-01-10 PROCEDURE — 93017 CV STRESS TEST TRACING ONLY: CPT

## 2024-01-10 PROCEDURE — 94729 DIFFUSING CAPACITY: CPT | Mod: S$GLB,,, | Performed by: INTERNAL MEDICINE

## 2024-01-10 PROCEDURE — 94060 EVALUATION OF WHEEZING: CPT | Mod: S$GLB,,, | Performed by: INTERNAL MEDICINE

## 2024-01-10 PROCEDURE — 93016 CV STRESS TEST SUPVJ ONLY: CPT | Mod: ,,, | Performed by: INTERNAL MEDICINE

## 2024-01-11 NOTE — PROGRESS NOTES
Subjective:      Patient ID: Manolo Osman is a 43 y.o. male.    Chief Complaint: Shortness of Breath and Obesity      Patient here for routine annual wellness exam.  He does report further weight gain, increased shortness of breath with any activity.  His job is very sedentary and he has an hour and a half commute both ways as well so his activity level has changed in the past few years.  He is concerned about the effect of this on his health in general    Shortness of Breath  Pertinent negatives include no abdominal pain, chest pain or leg swelling.     Review of Systems   Constitutional:  Positive for fatigue and unexpected weight change. Negative for activity change and appetite change.   HENT:  Negative for congestion.    Respiratory:  Positive for shortness of breath.    Cardiovascular:  Negative for chest pain and leg swelling.   Gastrointestinal:  Negative for abdominal pain.     Past Medical History:   Diagnosis Date    GERD (gastroesophageal reflux disease)           History reviewed. No pertinent surgical history.  Family History   Problem Relation Age of Onset    No Known Problems Mother     No Known Problems Father      Social History     Socioeconomic History    Marital status:    Tobacco Use    Smoking status: Some Days     Types: Cigarettes    Smokeless tobacco: Never    Tobacco comments:     Smokes 2 cigarettes a day.   Substance and Sexual Activity    Alcohol use: Yes     Comment: occ    Drug use: No    Sexual activity: Yes     Partners: Female     Birth control/protection: None     Social Determinants of Health     Financial Resource Strain: Low Risk  (1/6/2024)    Overall Financial Resource Strain (CARDIA)     Difficulty of Paying Living Expenses: Not hard at all   Food Insecurity: No Food Insecurity (1/6/2024)    Hunger Vital Sign     Worried About Running Out of Food in the Last Year: Never true     Ran Out of Food in the Last Year: Never true   Transportation Needs: No Transportation  "Needs (1/6/2024)    PRAPARE - Transportation     Lack of Transportation (Medical): No     Lack of Transportation (Non-Medical): No   Physical Activity: Patient Declined (1/6/2024)    Exercise Vital Sign     Days of Exercise per Week: Patient declined     Minutes of Exercise per Session: Patient declined   Stress: Stress Concern Present (1/6/2024)    [employer] - Occupational Stress Questionnaire     Feeling of Stress : Very much   Social Connections: Unknown (1/6/2024)    Social Connection and Isolation Panel [NHANES]     Frequency of Communication with Friends and Family: Three times a week     Frequency of Social Gatherings with Friends and Family: Once a week     Active Member of Clubs or Organizations: No     Attends Club or Organization Meetings: Never     Marital Status:    Housing Stability: Unknown (1/6/2024)    Housing Stability Vital Sign     Unable to Pay for Housing in the Last Year: No     Unstable Housing in the Last Year: No     Review of patient's allergies indicates:  No Known Allergies    Objective:       /80 (BP Location: Right arm, Patient Position: Sitting, BP Method: Large (Manual))   Pulse 104   Temp 98.7 °F (37.1 °C) (Tympanic)   Ht 5' 10" (1.778 m)   Wt 135.7 kg (299 lb 2.6 oz)   SpO2 98%   BMI 42.93 kg/m²   Physical Exam  Vitals reviewed.   Constitutional:       General: He is not in acute distress.     Appearance: Normal appearance. He is well-developed. He is obese. He is not ill-appearing or diaphoretic.   HENT:      Head: Normocephalic.      Right Ear: Hearing, tympanic membrane, ear canal and external ear normal.      Left Ear: Hearing, tympanic membrane, ear canal and external ear normal.      Nose: Nose normal.      Right Sinus: No maxillary sinus tenderness or frontal sinus tenderness.      Left Sinus: No maxillary sinus tenderness or frontal sinus tenderness.      Mouth/Throat:      Pharynx: Uvula midline. No oropharyngeal exudate. "   Eyes:      Conjunctiva/sclera: Conjunctivae normal.      Pupils: Pupils are equal, round, and reactive to light.   Cardiovascular:      Rate and Rhythm: Normal rate and regular rhythm.   Pulmonary:      Effort: Pulmonary effort is normal. No respiratory distress.      Breath sounds: Normal breath sounds.   Abdominal:      General: Bowel sounds are normal.      Palpations: Abdomen is soft.      Tenderness: There is no abdominal tenderness. There is no guarding.      Hernia: No hernia is present.   Musculoskeletal:         General: Normal range of motion.      Cervical back: Normal range of motion and neck supple.      Right lower leg: No edema.      Left lower leg: No edema.   Skin:     General: Skin is warm and dry.      Capillary Refill: Capillary refill takes less than 2 seconds.   Neurological:      General: No focal deficit present.      Mental Status: He is alert and oriented to person, place, and time.   Psychiatric:         Mood and Affect: Mood normal.         Behavior: Behavior normal.         Thought Content: Thought content normal.         Judgment: Judgment normal.       Assessment:     1. Routine adult health maintenance    2. SOB (shortness of breath)    3. Prediabetes      Plan:   Routine adult health maintenance    SOB (shortness of breath)  -     Exercise Stress - EKG; Future  -     Complete PFT w/ bronchodilator; Future    Prediabetes  -     Comprehensive Metabolic Panel; Future; Expected date: 01/09/2024  -     Hemoglobin A1C; Future; Expected date: 01/09/2024  -     CBC Auto Differential; Future; Expected date: 01/09/2024  -     Lipid Panel; Future; Expected date: 01/09/2024  -     TSH; Future; Expected date: 01/09/2024    Will schedule above testing, have labs drawn  Follow-up with me in about a week to discuss results  Medication List with Changes/Refills   Current Medications    ESOMEPRAZOLE (NEXIUM) 40 MG CAPSULE    Take 1 capsule (40 mg total) by mouth before breakfast.    PREDNISONE  (DELTASONE) 10 MG TABLET    Take 3 tablets a day for 4 days (1 before breakfast, 1 after lunch, 1 before bed). Then take 2 tablets a day for 4 days (1 before breakfast, 1 before bed). Then take 1 tablet a day for 4 days (1 before breakfast).

## 2024-01-16 ENCOUNTER — TELEPHONE (OUTPATIENT)
Dept: OTOLARYNGOLOGY | Facility: CLINIC | Age: 44
End: 2024-01-16
Payer: COMMERCIAL

## 2024-01-16 NOTE — TELEPHONE ENCOUNTER
----- Message from Chetna Whyte sent at 1/16/2024  8:29 AM CST -----  Contact: Miquel Ramos Is requesting for pt CT order to be sent over [facility], at fax # [phone].           Thanks

## 2024-01-17 ENCOUNTER — PATIENT MESSAGE (OUTPATIENT)
Dept: INTERNAL MEDICINE | Facility: CLINIC | Age: 44
End: 2024-01-17

## 2024-01-17 ENCOUNTER — OFFICE VISIT (OUTPATIENT)
Dept: INTERNAL MEDICINE | Facility: CLINIC | Age: 44
End: 2024-01-17
Payer: COMMERCIAL

## 2024-01-17 DIAGNOSIS — R06.83 SNORING: ICD-10-CM

## 2024-01-17 DIAGNOSIS — R06.02 SOB (SHORTNESS OF BREATH): Primary | ICD-10-CM

## 2024-01-17 DIAGNOSIS — R06.81 WITNESSED EPISODE OF APNEA: ICD-10-CM

## 2024-01-17 PROCEDURE — 3044F HG A1C LEVEL LT 7.0%: CPT | Mod: CPTII,95,, | Performed by: FAMILY MEDICINE

## 2024-01-17 PROCEDURE — 99213 OFFICE O/P EST LOW 20 MIN: CPT | Mod: 95,,, | Performed by: FAMILY MEDICINE

## 2024-01-17 NOTE — PROGRESS NOTES
The patient location is: [place]    The chief complaint leading to consultation is: follow up    Visit type: audiovisual    Face to Face time with patient: 8:35 minutes  13 minutes of total time spent on the encounter, which includes face to face time and non-face to face time preparing to see the patient (eg, review of tests), Obtaining and/or reviewing separately obtained history, Documenting clinical information in the electronic or other health record, Independently interpreting results (not separately reported) and communicating results to the patient/family/caregiver, or Care coordination (not separately reported).         Each patient to whom he or she provides medical services by telemedicine is:  (1) informed of the relationship between the physician and patient and the respective role of any other health care provider with respect to management of the patient; and (2) notified that he or she may decline to receive medical services by telemedicine and may withdraw from such care at any time.    Notes:     Subjective:      Patient ID: Manolo Osman is a 43 y.o. male.    Chief Complaint: No chief complaint on file.      Virtual visit for lab and procedure followup. Lab results at goal.   Treadmill stress test normal. PFT did show mild restriction.   Still having fatigue- works long hours but having difficulty sleeping. Reports he does snore, has had witnessed apneic episodes.  Has CT of sinuses scheduled       Review of Systems   Constitutional:  Positive for unexpected weight change. Negative for activity change.   HENT:  Positive for rhinorrhea. Negative for hearing loss and trouble swallowing.    Eyes:  Negative for discharge and visual disturbance.   Respiratory:  Negative for chest tightness and wheezing.    Cardiovascular:  Negative for chest pain and palpitations.   Gastrointestinal:  Negative for blood in stool, constipation, diarrhea and vomiting.   Endocrine: Negative for polydipsia and polyuria.    Genitourinary:  Negative for difficulty urinating, hematuria and urgency.   Musculoskeletal:  Negative for arthralgias, joint swelling and neck pain.   Neurological:  Negative for weakness and headaches.   Psychiatric/Behavioral:  Positive for dysphoric mood. Negative for confusion.      Past Medical History:   Diagnosis Date    GERD (gastroesophageal reflux disease)           No past surgical history on file.  Family History   Problem Relation Age of Onset    No Known Problems Mother     No Known Problems Father      Social History     Socioeconomic History    Marital status:    Tobacco Use    Smoking status: Some Days     Types: Cigarettes    Smokeless tobacco: Never    Tobacco comments:     Smokes 2 cigarettes a day.   Substance and Sexual Activity    Alcohol use: Yes     Comment: occ    Drug use: No    Sexual activity: Yes     Partners: Female     Birth control/protection: None     Social Determinants of Health     Financial Resource Strain: Low Risk  (1/6/2024)    Overall Financial Resource Strain (CARDIA)     Difficulty of Paying Living Expenses: Not hard at all   Food Insecurity: No Food Insecurity (1/6/2024)    Hunger Vital Sign     Worried About Running Out of Food in the Last Year: Never true     Ran Out of Food in the Last Year: Never true   Transportation Needs: No Transportation Needs (1/6/2024)    PRAPARE - Transportation     Lack of Transportation (Medical): No     Lack of Transportation (Non-Medical): No   Physical Activity: Patient Declined (1/6/2024)    Exercise Vital Sign     Days of Exercise per Week: Patient declined     Minutes of Exercise per Session: Patient declined   Stress: Stress Concern Present (1/6/2024)    [employer] - Occupational Stress Questionnaire     Feeling of Stress : Very much   Social Connections: Unknown (1/6/2024)    Social Connection and Isolation Panel [NHANES]     Frequency of Communication with Friends and Family: Three times a  week     Frequency of Social Gatherings with Friends and Family: Once a week     Active Member of Clubs or Organizations: No     Attends Club or Organization Meetings: Never     Marital Status:    Housing Stability: Unknown (1/6/2024)    Housing Stability Vital Sign     Unable to Pay for Housing in the Last Year: No     Unstable Housing in the Last Year: No     Review of patient's allergies indicates:  No Known Allergies    Objective:       There were no vitals taken for this visit.  Physical Exam  Constitutional:       General: He is not in acute distress.     Appearance: Normal appearance. He is well-developed. He is not ill-appearing or diaphoretic.   Neurological:      General: No focal deficit present.      Mental Status: He is alert and oriented to person, place, and time.   Psychiatric:         Mood and Affect: Mood normal.         Behavior: Behavior normal.         Thought Content: Thought content normal.         Judgment: Judgment normal.       Assessment:     1. SOB (shortness of breath)    2. Snoring    3. Witnessed episode of apnea      Plan:   SOB (shortness of breath)  -     Ambulatory referral/consult to Pulmonology; Future; Expected date: 01/24/2024    Snoring  -     PULSE OXIMETRY OVERNIGHT; Future    Witnessed episode of apnea  -     PULSE OXIMETRY OVERNIGHT; Future      Medication List with Changes/Refills   Current Medications    ESOMEPRAZOLE (NEXIUM) 40 MG CAPSULE    Take 1 capsule (40 mg total) by mouth before breakfast.    PREDNISONE (DELTASONE) 10 MG TABLET    Take 3 tablets a day for 4 days (1 before breakfast, 1 after lunch, 1 before bed). Then take 2 tablets a day for 4 days (1 before breakfast, 1 before bed). Then take 1 tablet a day for 4 days (1 before breakfast).

## 2024-01-20 ENCOUNTER — PATIENT MESSAGE (OUTPATIENT)
Dept: INTERNAL MEDICINE | Facility: CLINIC | Age: 44
End: 2024-01-20
Payer: COMMERCIAL

## 2024-01-22 ENCOUNTER — TELEPHONE (OUTPATIENT)
Dept: OTOLARYNGOLOGY | Facility: CLINIC | Age: 44
End: 2024-01-22
Payer: COMMERCIAL

## 2024-01-22 NOTE — TELEPHONE ENCOUNTER
----- Message from Celi Bryant sent at 1/22/2024 11:06 AM CST -----  Mahnaz Ramos called regarding sending the order for CT to [facility]. Call back number is [phone] or [phone]. [email or web address]

## 2024-01-24 ENCOUNTER — OFFICE VISIT (OUTPATIENT)
Dept: PULMONOLOGY | Facility: CLINIC | Age: 44
End: 2024-01-24
Payer: COMMERCIAL

## 2024-01-24 ENCOUNTER — TELEPHONE (OUTPATIENT)
Dept: SLEEP MEDICINE | Facility: CLINIC | Age: 44
End: 2024-01-24
Payer: COMMERCIAL

## 2024-01-24 DIAGNOSIS — R06.02 SOB (SHORTNESS OF BREATH): ICD-10-CM

## 2024-01-24 DIAGNOSIS — G47.33 OBSTRUCTIVE SLEEP APNEA SYNDROME: Primary | ICD-10-CM

## 2024-01-24 PROCEDURE — 99203 OFFICE O/P NEW LOW 30 MIN: CPT | Mod: 95,,, | Performed by: INTERNAL MEDICINE

## 2024-01-24 PROCEDURE — 1160F RVW MEDS BY RX/DR IN RCRD: CPT | Mod: CPTII,95,, | Performed by: INTERNAL MEDICINE

## 2024-01-24 PROCEDURE — 1159F MED LIST DOCD IN RCRD: CPT | Mod: CPTII,95,, | Performed by: INTERNAL MEDICINE

## 2024-01-24 PROCEDURE — 3044F HG A1C LEVEL LT 7.0%: CPT | Mod: CPTII,95,, | Performed by: INTERNAL MEDICINE

## 2024-01-24 NOTE — PROGRESS NOTES
"Virtual Video Pulmonary Clinic Visit          1/24/2024       The patient has given consent for a virtual visit either by video or by phone. The patient understands that there could be security /privacy concerns if a non Epic system is required. The patient understands that they will be charged for a virtual visit.    Todays visit is being performed via video visit. I have confirmed that the patient is currently located in the [facility] at patient's residence. The participants of this video visit are Manolo Osman, MRN:[id number] and myself.      Chief Complaint: dyspnea      HPI:  This patient is a 43 y.o. Black or  male with HTN, morbid obesity requests evaluation for breathlessness with exertion. Gradually worse over a year or more. Very sedentary with work, significant weight gain. No exercise. Also asking about possible JAVED. Wife says he "quits breathing" at night. + EDS, ESS 13. Had cardiac eval, normal. Chest radiograph and PFT normal. No hx of asthma or lung disease.    Past Medical History:   Diagnosis Date    GERD (gastroesophageal reflux disease)      Past   Family History   Problem Relation Age of Onset    No Known Problems Mother     No Known Problems Father         Patient Active Problem List    Diagnosis Date Noted    GERD (gastroesophageal reflux disease)             Review of patient's allergies indicates:  No Known Allergies       Current Outpatient Medications   Medication Sig Dispense Refill    esomeprazole (NEXIUM) 40 MG capsule Take 1 capsule (40 mg total) by mouth before breakfast. 30 capsule 11    predniSONE (DELTASONE) 10 MG tablet Take 3 tablets a day for 4 days (1 before breakfast, 1 after lunch, 1 before bed). Then take 2 tablets a day for 4 days (1 before breakfast, 1 before bed). Then take 1 tablet a day for 4 days (1 before breakfast). (Patient not taking: Reported on 1/9/2024) 24 tablet 0     No current facility-administered medications for this visit.    "     Review of Systems:    Skin: no new rash  Neuro: no recent tia or syncope  HEENT: no new deafness; no new blindness  GI: no nausea,diarrhea or bleeding  Pulm: see hpi  ROS otherwise unremarkable for new issues    Physical Exam:  RESP: no respiratory distress,stridor or tachypnea  HEENT: normocephalic, eom intact; no alopecia  SKIN: no rash  CHEST: no phthisical changes  ABDOMEN: not distended  NEURO: alert ,oriented x 3, moves all extremities  GAIT: normal  CVS: no obvious JVD, no edema  EXTREMITIES: no clubbing  PSY: normal affect and judgment      Recent lab studies and radiological images reviewed and interpreted.    Assessment:    1. Obstructive sleep apnea syndrome  Home Sleep Study      2. SOB (shortness of breath)  Ambulatory referral/consult to Pulmonology           Plan:  - Deconditioning  - needs significant weight loss  - Exercise  - home sleep study ordered, will notify of results    Follow up with primary care re general medical problems    Return to clinic in PRN    20 minutes were spent virtual face to face with Manolo Osman with greater than 50% of the time spent in counseling or coordination of care including discussion of symptoms, problems noted, review of records, lab and images with patient as well as diagnosis, benefits and risks of treatment plan and further evaluation.  All of the patient's questions were answered during this discussion.

## 2024-01-24 NOTE — PROGRESS NOTES
"Subjective:      Patient ID: Manolo Osman is a 43 y.o. male.    Chief Complaint: SOB      Review of Systems  Objective:     Physical Exam  Personal Diagnostic Review  {Pulmonary diagnostics:89274}      1/9/2024     2:52 PM 1/9/2024     9:09 AM 4/20/2023     3:46 PM 4/14/2023     2:19 PM 1/23/2023     8:52 AM 8/22/2022     7:43 AM 1/15/2018     2:40 PM   Pulmonary Function Tests   SpO2 98 %   95 %      Height 5' 10" (1.778 m)  5' 10" (1.778 m) 5' 10" (1.778 m)      Weight 135.7 kg (299 lb 2.6 oz) 134.7 kg (296 lb 15.4 oz) 132.9 kg (293 lb) 133.1 kg (293 lb 6.9 oz) 133.2 kg (293 lb 10.4 oz) 122.6 kg (270 lb 4.5 oz) 108 kg (238 lb)   BMI (Calculated) 42.9  42 42.1           Assessment:     1. SOB (shortness of breath)         Outpatient Encounter Medications as of 1/24/2024   Medication Sig Dispense Refill    esomeprazole (NEXIUM) 40 MG capsule Take 1 capsule (40 mg total) by mouth before breakfast. 30 capsule 11    predniSONE (DELTASONE) 10 MG tablet Take 3 tablets a day for 4 days (1 before breakfast, 1 after lunch, 1 before bed). Then take 2 tablets a day for 4 days (1 before breakfast, 1 before bed). Then take 1 tablet a day for 4 days (1 before breakfast). (Patient not taking: Reported on 1/9/2024) 24 tablet 0     No facility-administered encounter medications on file as of 1/24/2024.     No orders of the defined types were placed in this encounter.    Pulmonary Lab Results:   {sentrypulmresults:22376}      Plan:     ***     "

## 2024-01-24 NOTE — TELEPHONE ENCOUNTER
----- Message from Nikolay De La Cruz sent at 1/24/2024  9:08 AM CST -----  [facility]

## 2024-01-25 ENCOUNTER — TELEPHONE (OUTPATIENT)
Dept: OTOLARYNGOLOGY | Facility: CLINIC | Age: 44
End: 2024-01-25
Payer: COMMERCIAL

## 2024-01-29 ENCOUNTER — OFFICE VISIT (OUTPATIENT)
Dept: SURGERY | Facility: CLINIC | Age: 44
End: 2024-01-29
Payer: COMMERCIAL

## 2024-01-29 ENCOUNTER — OFFICE VISIT (OUTPATIENT)
Dept: INTERNAL MEDICINE | Facility: CLINIC | Age: 44
End: 2024-01-29
Payer: COMMERCIAL

## 2024-01-29 ENCOUNTER — LAB VISIT (OUTPATIENT)
Dept: LAB | Facility: HOSPITAL | Age: 44
End: 2024-01-29
Attending: SURGERY
Payer: COMMERCIAL

## 2024-01-29 VITALS
HEIGHT: 69 IN | BODY MASS INDEX: 43.99 KG/M2 | HEART RATE: 108 BPM | DIASTOLIC BLOOD PRESSURE: 97 MMHG | WEIGHT: 297 LBS | SYSTOLIC BLOOD PRESSURE: 156 MMHG | TEMPERATURE: 99 F

## 2024-01-29 VITALS
OXYGEN SATURATION: 96 % | BODY MASS INDEX: 42.57 KG/M2 | HEART RATE: 115 BPM | TEMPERATURE: 98 F | HEIGHT: 70 IN | DIASTOLIC BLOOD PRESSURE: 96 MMHG | WEIGHT: 297.38 LBS | SYSTOLIC BLOOD PRESSURE: 156 MMHG

## 2024-01-29 DIAGNOSIS — D17.0 LIPOMA OF SCALP: ICD-10-CM

## 2024-01-29 DIAGNOSIS — R73.03 PREDIABETES: ICD-10-CM

## 2024-01-29 DIAGNOSIS — D23.4 DERMOID CYST OF SCALP: ICD-10-CM

## 2024-01-29 DIAGNOSIS — D23.4 DERMOID CYST OF SCALP: Primary | ICD-10-CM

## 2024-01-29 DIAGNOSIS — R03.0 ELEVATED BLOOD PRESSURE READING: ICD-10-CM

## 2024-01-29 DIAGNOSIS — D17.0 LIPOMA OF SCALP: Primary | ICD-10-CM

## 2024-01-29 LAB
ALBUMIN SERPL BCP-MCNC: 3.6 G/DL (ref 3.5–5.2)
ALP SERPL-CCNC: 79 U/L (ref 55–135)
ALT SERPL W/O P-5'-P-CCNC: 25 U/L (ref 10–44)
ANION GAP SERPL CALC-SCNC: 10 MMOL/L (ref 8–16)
AST SERPL-CCNC: 23 U/L (ref 10–40)
BILIRUB SERPL-MCNC: 0.3 MG/DL (ref 0.1–1)
BUN SERPL-MCNC: 10 MG/DL (ref 6–20)
CALCIUM SERPL-MCNC: 9.8 MG/DL (ref 8.7–10.5)
CHLORIDE SERPL-SCNC: 103 MMOL/L (ref 95–110)
CO2 SERPL-SCNC: 24 MMOL/L (ref 23–29)
CREAT SERPL-MCNC: 1.1 MG/DL (ref 0.5–1.4)
EST. GFR  (NO RACE VARIABLE): >60 ML/MIN/1.73 M^2
GLUCOSE SERPL-MCNC: 104 MG/DL (ref 70–110)
POTASSIUM SERPL-SCNC: 4.3 MMOL/L (ref 3.5–5.1)
PROT SERPL-MCNC: 7.6 G/DL (ref 6–8.4)
SODIUM SERPL-SCNC: 137 MMOL/L (ref 136–145)

## 2024-01-29 PROCEDURE — 3080F DIAST BP >= 90 MM HG: CPT | Mod: CPTII,S$GLB,, | Performed by: FAMILY MEDICINE

## 2024-01-29 PROCEDURE — 3044F HG A1C LEVEL LT 7.0%: CPT | Mod: CPTII,S$GLB,, | Performed by: SURGERY

## 2024-01-29 PROCEDURE — 99999 PR PBB SHADOW E&M-EST. PATIENT-LVL V: CPT | Mod: PBBFAC,,, | Performed by: SURGERY

## 2024-01-29 PROCEDURE — 1159F MED LIST DOCD IN RCRD: CPT | Mod: CPTII,S$GLB,, | Performed by: SURGERY

## 2024-01-29 PROCEDURE — 99213 OFFICE O/P EST LOW 20 MIN: CPT | Mod: S$GLB,,, | Performed by: FAMILY MEDICINE

## 2024-01-29 PROCEDURE — 80053 COMPREHEN METABOLIC PANEL: CPT | Performed by: SURGERY

## 2024-01-29 PROCEDURE — 85025 COMPLETE CBC W/AUTO DIFF WBC: CPT | Performed by: SURGERY

## 2024-01-29 PROCEDURE — 3008F BODY MASS INDEX DOCD: CPT | Mod: CPTII,S$GLB,, | Performed by: FAMILY MEDICINE

## 2024-01-29 PROCEDURE — 3080F DIAST BP >= 90 MM HG: CPT | Mod: CPTII,S$GLB,, | Performed by: SURGERY

## 2024-01-29 PROCEDURE — 3077F SYST BP >= 140 MM HG: CPT | Mod: CPTII,S$GLB,, | Performed by: SURGERY

## 2024-01-29 PROCEDURE — 3008F BODY MASS INDEX DOCD: CPT | Mod: CPTII,S$GLB,, | Performed by: SURGERY

## 2024-01-29 PROCEDURE — 99204 OFFICE O/P NEW MOD 45 MIN: CPT | Mod: S$GLB,,, | Performed by: SURGERY

## 2024-01-29 PROCEDURE — 1159F MED LIST DOCD IN RCRD: CPT | Mod: CPTII,S$GLB,, | Performed by: FAMILY MEDICINE

## 2024-01-29 PROCEDURE — 36415 COLL VENOUS BLD VENIPUNCTURE: CPT | Performed by: SURGERY

## 2024-01-29 PROCEDURE — 3077F SYST BP >= 140 MM HG: CPT | Mod: CPTII,S$GLB,, | Performed by: FAMILY MEDICINE

## 2024-01-29 PROCEDURE — 99999 PR PBB SHADOW E&M-EST. PATIENT-LVL IV: CPT | Mod: PBBFAC,,, | Performed by: FAMILY MEDICINE

## 2024-01-29 PROCEDURE — 3044F HG A1C LEVEL LT 7.0%: CPT | Mod: CPTII,S$GLB,, | Performed by: FAMILY MEDICINE

## 2024-01-29 RX ORDER — ONDANSETRON 8 MG/1
8 TABLET, ORALLY DISINTEGRATING ORAL EVERY 8 HOURS PRN
Status: CANCELLED | OUTPATIENT
Start: 2024-01-29

## 2024-01-29 RX ORDER — CEFAZOLIN SODIUM 2 G/50ML
2 SOLUTION INTRAVENOUS
Status: CANCELLED | OUTPATIENT
Start: 2024-01-29

## 2024-01-29 RX ORDER — METFORMIN HYDROCHLORIDE 500 MG/1
500 TABLET ORAL 2 TIMES DAILY WITH MEALS
Qty: 60 TABLET | Refills: 11 | Status: SHIPPED | OUTPATIENT
Start: 2024-01-29 | End: 2024-02-12 | Stop reason: SDUPTHER

## 2024-01-29 RX ORDER — METFORMIN HYDROCHLORIDE 500 MG/1
500 TABLET ORAL 2 TIMES DAILY WITH MEALS
COMMUNITY
End: 2024-01-29 | Stop reason: SDUPTHER

## 2024-01-29 RX ORDER — LIDOCAINE HYDROCHLORIDE 10 MG/ML
1 INJECTION, SOLUTION EPIDURAL; INFILTRATION; INTRACAUDAL; PERINEURAL ONCE
Status: ACTIVE | OUTPATIENT
Start: 2024-01-29

## 2024-01-29 NOTE — PROGRESS NOTES
Patient ID: Manolo Osman is a 43 y.o. male.    Chief Complaint: Consult (Scalp cyst)      HPI:  Patient was sent for evaluation of a cyst on his scalp.  This is located on the anterior forehead a proximally 2 cm above his hairline.  It does not cause him any pain or discomfort.  He would however like to have it removed as it was slowly enlarging    Review of Systems   Constitutional:  Positive for unexpected weight change. Negative for activity change.   HENT:  Positive for rhinorrhea. Negative for hearing loss and trouble swallowing.    Eyes:  Negative for discharge and visual disturbance.   Respiratory:  Negative for chest tightness and wheezing.    Cardiovascular:  Negative for chest pain and palpitations.   Gastrointestinal:  Negative for blood in stool, constipation, diarrhea and vomiting.   Endocrine: Negative for polydipsia and polyuria.   Genitourinary:  Negative for difficulty urinating, hematuria and urgency.   Musculoskeletal:  Negative for arthralgias, joint swelling and neck pain.   Skin:         Mass of the forehead   Neurological:  Negative for weakness and headaches.   Psychiatric/Behavioral:  Negative for confusion and dysphoric mood.      Current Outpatient Medications   Medication Sig Dispense Refill    metFORMIN (GLUCOPHAGE) 500 MG tablet Take 1 tablet (500 mg total) by mouth 2 (two) times daily with meals. 60 tablet 11     Current Facility-Administered Medications   Medication Dose Route Frequency Provider Last Rate Last Admin    LIDOcaine (PF) 10 mg/ml (1%) injection 10 mg  1 mL Intradermal Once Bear Rios MD           Review of patient's allergies indicates:  No Known Allergies    Past Medical History:   Diagnosis Date    GERD (gastroesophageal reflux disease)        No past surgical history on file.    Social History     Socioeconomic History    Marital status:    Tobacco Use    Smoking status: Some Days     Types: Cigarettes    Smokeless tobacco: Never    Tobacco comments:      Smokes 2 cigarettes a day.   Substance and Sexual Activity    Alcohol use: Yes     Comment: occ    Drug use: No    Sexual activity: Yes     Partners: Female     Birth control/protection: None     Social Determinants of Health     Financial Resource Strain: Low Risk  (1/6/2024)    Overall Financial Resource Strain (CARDIA)     Difficulty of Paying Living Expenses: Not hard at all   Food Insecurity: No Food Insecurity (1/6/2024)    Hunger Vital Sign     Worried About Running Out of Food in the Last Year: Never true     Ran Out of Food in the Last Year: Never true   Transportation Needs: No Transportation Needs (1/6/2024)    PRAPARE - Transportation     Lack of Transportation (Medical): No     Lack of Transportation (Non-Medical): No   Physical Activity: Patient Declined (1/6/2024)    Exercise Vital Sign     Days of Exercise per Week: Patient declined     Minutes of Exercise per Session: Patient declined   Stress: Stress Concern Present (1/6/2024)    [employer] - Occupational Stress Questionnaire     Feeling of Stress : Very much   Social Connections: Unknown (1/6/2024)    Social Connection and Isolation Panel [NHANES]     Frequency of Communication with Friends and Family: Three times a week     Frequency of Social Gatherings with Friends and Family: Once a week     Active Member of Clubs or Organizations: No     Attends Club or Organization Meetings: Never     Marital Status:    Housing Stability: Unknown (1/6/2024)    Housing Stability Vital Sign     Unable to Pay for Housing in the Last Year: No     Unstable Housing in the Last Year: No       Vitals:    01/29/24 1447   BP: (!) 156/97   Pulse: 108   Temp: 98.7 °F (37.1 °C)       Physical Exam  Constitutional:       General: He is not in acute distress.     Appearance: He is well-developed. He is obese.   HENT:      Head: Normocephalic and atraumatic.      Comments: On the forehead in the midline is a 5 x 4 cm oval, soft,  mobile mass  Eyes:      General: No scleral icterus.     Pupils: Pupils are equal, round, and reactive to light.   Neck:      Thyroid: No thyromegaly.      Vascular: No JVD.      Trachea: No tracheal deviation.   Cardiovascular:      Rate and Rhythm: Normal rate and regular rhythm.      Heart sounds: Normal heart sounds.   Pulmonary:      Effort: Pulmonary effort is normal.      Breath sounds: Normal breath sounds.   Abdominal:      General: Bowel sounds are normal. There is no distension.      Palpations: Abdomen is soft. There is no mass.      Tenderness: There is no abdominal tenderness. There is no guarding or rebound.      Comments: Obese   Musculoskeletal:         General: Normal range of motion.      Cervical back: Normal range of motion and neck supple.   Lymphadenopathy:      Cervical: No cervical adenopathy.   Skin:     General: Skin is warm and dry.   Neurological:      Mental Status: He is alert and oriented to person, place, and time.   Psychiatric:         Mood and Affect: Mood normal.         Behavior: Behavior normal.         Thought Content: Thought content normal.         Judgment: Judgment normal.     Assessment & Plan:  Lipoma of the forehead.      I discussed the natural history, enlarging staying the same size.      The patient would like to have the mass removed.      The risks include infection, bleeding, recurrence, and seroma.      You will need a CBC, and CMP prior to surgery.      Surgery scheduled for Friday February 2nd

## 2024-01-29 NOTE — PATIENT INSTRUCTIONS
Coma removal surgery scheduled for this Friday at [facility] [address] at approximately 12:30 p.m. in the afternoon.      The hospital call you the night before with a time of arrival.      No solid food after midnight on Thursday February 1st but you may have clear liquids up to 3 hours before your arrival time at the hospital.      Do not take your metformin on the morning of surgery.      If any of your labs are abnormal we will let you know    Our office phone numbers are  [phone] and

## 2024-01-29 NOTE — PROGRESS NOTES
Subjective:      Patient ID: Manolo Osman is a 43 y.o. male.    Chief Complaint: Cyst (Knot on top of head)      Patient reports not on top of his head has been growing, 1st noticed about 3 or 4 months ago, increasing in size, friend/family have noticed and commented on it.  No pain or irritation to the area.  Also reports he had recently started taking his wife's metformin prescription for his prediabetes, A1c is improved on last recheck, tolerating without difficulty.  Blood pressure elevated today-controlled at last visit earlier this month, no known history of hypertension    Cyst      Review of Systems   Skin:  Negative for color change and wound.     Past Medical History:   Diagnosis Date    GERD (gastroesophageal reflux disease)           History reviewed. No pertinent surgical history.  Family History   Problem Relation Age of Onset    No Known Problems Mother     No Known Problems Father      Social History     Socioeconomic History    Marital status:    Tobacco Use    Smoking status: Some Days     Types: Cigarettes    Smokeless tobacco: Never    Tobacco comments:     Smokes 2 cigarettes a day.   Substance and Sexual Activity    Alcohol use: Yes     Comment: occ    Drug use: No    Sexual activity: Yes     Partners: Female     Birth control/protection: None     Social Determinants of Health     Financial Resource Strain: Low Risk  (1/6/2024)    Overall Financial Resource Strain (CARDIA)     Difficulty of Paying Living Expenses: Not hard at all   Food Insecurity: No Food Insecurity (1/6/2024)    Hunger Vital Sign     Worried About Running Out of Food in the Last Year: Never true     Ran Out of Food in the Last Year: Never true   Transportation Needs: No Transportation Needs (1/6/2024)    PRAPARE - Transportation     Lack of Transportation (Medical): No     Lack of Transportation (Non-Medical): No   Physical Activity: Patient Declined (1/6/2024)    Exercise Vital Sign     Days of Exercise per Week:  "Patient declined     Minutes of Exercise per Session: Patient declined   Stress: Stress Concern Present (1/6/2024)    [employer] - Occupational Stress Questionnaire     Feeling of Stress : Very much   Social Connections: Unknown (1/6/2024)    Social Connection and Isolation Panel [NHANES]     Frequency of Communication with Friends and Family: Three times a week     Frequency of Social Gatherings with Friends and Family: Once a week     Active Member of Clubs or Organizations: No     Attends Club or Organization Meetings: Never     Marital Status:    Housing Stability: Unknown (1/6/2024)    Housing Stability Vital Sign     Unable to Pay for Housing in the Last Year: No     Unstable Housing in the Last Year: No     Review of patient's allergies indicates:  No Known Allergies    Objective:       BP (!) 156/96 (BP Location: Left arm, Patient Position: Sitting, BP Method: Large (Manual))   Pulse (!) 115   Temp 98.1 °F (36.7 °C) (Tympanic)   Ht 5' 10" (1.778 m)   Wt 134.9 kg (297 lb 6.4 oz)   SpO2 96%   BMI 42.67 kg/m²   Physical Exam  Constitutional:       General: He is not in acute distress.     Appearance: Normal appearance. He is well-developed. He is not ill-appearing or diaphoretic.   Skin:     Comments: Soft tissue palpable mass on top of scalp-feels like cyst   Neurological:      General: No focal deficit present.      Mental Status: He is alert and oriented to person, place, and time.   Psychiatric:         Mood and Affect: Mood normal.         Behavior: Behavior normal.         Thought Content: Thought content normal.         Judgment: Judgment normal.       Assessment:     1. Dermoid cyst of scalp    2. Prediabetes    3. Elevated blood pressure reading      Plan:   Dermoid cyst of scalp  -     Ambulatory referral/consult to General Surgery; Future; Expected date: 02/05/2024    Prediabetes    Elevated blood pressure reading    Other orders  -     metFORMIN " (GLUCOPHAGE) 500 MG tablet; Take 1 tablet (500 mg total) by mouth 2 (two) times daily with meals.  Dispense: 60 tablet; Refill: 11    Needs 2 week nurse blood pressure recheck  Patient is interested in excision of cyst, will send him to surgery to discuss this  Medication List with Changes/Refills   Changed and/or Refilled Medications    Modified Medication Previous Medication    METFORMIN (GLUCOPHAGE) 500 MG TABLET metFORMIN (GLUCOPHAGE) 500 MG tablet       Take 1 tablet (500 mg total) by mouth 2 (two) times daily with meals.    Take 500 mg by mouth 2 (two) times daily with meals.   Discontinued Medications    ESOMEPRAZOLE (NEXIUM) 40 MG CAPSULE    Take 1 capsule (40 mg total) by mouth before breakfast.    PREDNISONE (DELTASONE) 10 MG TABLET    Take 3 tablets a day for 4 days (1 before breakfast, 1 after lunch, 1 before bed). Then take 2 tablets a day for 4 days (1 before breakfast, 1 before bed). Then take 1 tablet a day for 4 days (1 before breakfast).

## 2024-01-30 LAB
BASOPHILS # BLD AUTO: 0.03 K/UL (ref 0–0.2)
BASOPHILS NFR BLD: 0.3 % (ref 0–1.9)
DIFFERENTIAL METHOD BLD: NORMAL
EOSINOPHIL # BLD AUTO: 0.1 K/UL (ref 0–0.5)
EOSINOPHIL NFR BLD: 0.8 % (ref 0–8)
ERYTHROCYTE [DISTWIDTH] IN BLOOD BY AUTOMATED COUNT: 13.3 % (ref 11.5–14.5)
HCT VFR BLD AUTO: 45 % (ref 40–54)
HGB BLD-MCNC: 14.5 G/DL (ref 14–18)
IMM GRANULOCYTES # BLD AUTO: 0.04 K/UL (ref 0–0.04)
IMM GRANULOCYTES NFR BLD AUTO: 0.4 % (ref 0–0.5)
LYMPHOCYTES # BLD AUTO: 3 K/UL (ref 1–4.8)
LYMPHOCYTES NFR BLD: 28.4 % (ref 18–48)
MCH RBC QN AUTO: 29.2 PG (ref 27–31)
MCHC RBC AUTO-ENTMCNC: 32.2 G/DL (ref 32–36)
MCV RBC AUTO: 91 FL (ref 82–98)
MONOCYTES # BLD AUTO: 1 K/UL (ref 0.3–1)
MONOCYTES NFR BLD: 8.9 % (ref 4–15)
NEUTROPHILS # BLD AUTO: 6.5 K/UL (ref 1.8–7.7)
NEUTROPHILS NFR BLD: 61.2 % (ref 38–73)
NRBC BLD-RTO: 0 /100 WBC
PLATELET # BLD AUTO: 350 K/UL (ref 150–450)
PMV BLD AUTO: 10 FL (ref 9.2–12.9)
RBC # BLD AUTO: 4.97 M/UL (ref 4.6–6.2)
WBC # BLD AUTO: 10.66 K/UL (ref 3.9–12.7)

## 2024-02-12 VITALS — SYSTOLIC BLOOD PRESSURE: 136 MMHG | DIASTOLIC BLOOD PRESSURE: 80 MMHG

## 2024-02-12 NOTE — TELEPHONE ENCOUNTER
BP check in left arm reading 140/80 ( sitting ) Pulse 98  R 20   Pulse ox 98 % from left ring finger  Pain : 0  ; advices pt to remain in room x 10 minutes to recheck BP again ; pt agreed and verbalized understanding ; second BP check done with reading 136/ 80 from left arm

## 2024-02-12 NOTE — TELEPHONE ENCOUNTER
No care due was identified.  [facility] Embedded Care Due Messages. Reference number: [id number].   2/12/2024 1:04:35 PM CST

## 2024-02-13 RX ORDER — METFORMIN HYDROCHLORIDE 500 MG/1
500 TABLET ORAL 2 TIMES DAILY WITH MEALS
Qty: 60 TABLET | Refills: 11 | Status: SHIPPED | OUTPATIENT
Start: 2024-02-13

## 2024-02-19 ENCOUNTER — OFFICE VISIT (OUTPATIENT)
Dept: OTOLARYNGOLOGY | Facility: CLINIC | Age: 44
End: 2024-02-19
Payer: COMMERCIAL

## 2024-02-19 ENCOUNTER — HOSPITAL ENCOUNTER (OUTPATIENT)
Dept: RADIOLOGY | Facility: HOSPITAL | Age: 44
Discharge: HOME | End: 2024-02-19
Attending: STUDENT IN AN ORGANIZED HEALTH CARE EDUCATION/TRAINING PROGRAM
Payer: COMMERCIAL

## 2024-02-19 VITALS — TEMPERATURE: 98 F | BODY MASS INDEX: 43.76 KG/M2 | WEIGHT: 295.44 LBS | HEIGHT: 69 IN

## 2024-02-19 DIAGNOSIS — J34.2 NASAL SEPTAL DEVIATION: ICD-10-CM

## 2024-02-19 DIAGNOSIS — J32.9 CHRONIC SINUSITIS, UNSPECIFIED LOCATION: ICD-10-CM

## 2024-02-19 DIAGNOSIS — J32.4 CHRONIC PANSINUSITIS: Primary | ICD-10-CM

## 2024-02-19 DIAGNOSIS — J30.9 ALLERGIC RHINITIS, UNSPECIFIED SEASONALITY, UNSPECIFIED TRIGGER: ICD-10-CM

## 2024-02-19 DIAGNOSIS — J33.9 NASAL POLYPOSIS: ICD-10-CM

## 2024-02-19 DIAGNOSIS — J34.3 HYPERTROPHY OF BOTH INFERIOR NASAL TURBINATES: ICD-10-CM

## 2024-02-19 DIAGNOSIS — J34.89 NASAL OBSTRUCTION: ICD-10-CM

## 2024-02-19 PROCEDURE — 99999 PR PBB SHADOW E&M-EST. PATIENT-LVL III: CPT | Mod: PBBFAC,,, | Performed by: STUDENT IN AN ORGANIZED HEALTH CARE EDUCATION/TRAINING PROGRAM

## 2024-02-19 PROCEDURE — 70486 CT MAXILLOFACIAL W/O DYE: CPT | Mod: 26,,, | Performed by: RADIOLOGY

## 2024-02-19 PROCEDURE — 3044F HG A1C LEVEL LT 7.0%: CPT | Mod: CPTII,S$GLB,, | Performed by: STUDENT IN AN ORGANIZED HEALTH CARE EDUCATION/TRAINING PROGRAM

## 2024-02-19 PROCEDURE — 99214 OFFICE O/P EST MOD 30 MIN: CPT | Mod: S$GLB,,, | Performed by: STUDENT IN AN ORGANIZED HEALTH CARE EDUCATION/TRAINING PROGRAM

## 2024-02-19 PROCEDURE — 70486 CT MAXILLOFACIAL W/O DYE: CPT | Mod: TC

## 2024-02-19 PROCEDURE — 3008F BODY MASS INDEX DOCD: CPT | Mod: CPTII,S$GLB,, | Performed by: STUDENT IN AN ORGANIZED HEALTH CARE EDUCATION/TRAINING PROGRAM

## 2024-02-19 PROCEDURE — 1159F MED LIST DOCD IN RCRD: CPT | Mod: CPTII,S$GLB,, | Performed by: STUDENT IN AN ORGANIZED HEALTH CARE EDUCATION/TRAINING PROGRAM

## 2024-02-19 RX ORDER — PREDNISOLONE SODIUM PHOSPHATE 10 MG/1
TABLET, ORALLY DISINTEGRATING ORAL
Qty: 24 TABLET | Refills: 0 | Status: CANCELLED | OUTPATIENT
Start: 2024-02-19

## 2024-02-19 RX ORDER — PREDNISONE 10 MG/1
TABLET ORAL
Qty: 24 TABLET | Refills: 0 | Status: SHIPPED | OUTPATIENT
Start: 2024-02-19

## 2024-02-19 RX ORDER — AMOXICILLIN AND CLAVULANATE POTASSIUM 875; 125 MG/1; MG/1
1 TABLET, FILM COATED ORAL EVERY 12 HOURS
Qty: 14 TABLET | Refills: 0 | Status: SHIPPED | OUTPATIENT
Start: 2024-02-19 | End: 2024-02-26

## 2024-02-19 NOTE — PROGRESS NOTES
Chief complaint:    Chief Complaint   Patient presents with    Follow-up     CT results         Referring Provider:  No referring provider defined for this encounter.      History of present illness, 5/6/22:     Mr. Osman is a 43 y.o. presenting for evaluation of sinus issues.     Frequent anterior rhinorrhea (thick, green, usually left), left retrorbital and forehead pain, left nasal obstruction.     Had a similar episode about 2 months ago. Treated with abx, then got mostly better, but never totally cleared. Now symptoms more severe again over last couple weeks.     No inciting incident.      Denies history of allergies. No treatment.      Denies frequent prior sinus infections.       Prior sinus surgery: no.     Asthma history: No     NSAID/ASA allergy: No     Current smoker:  No    Return clinic visit, 8/22/22    Main symptom now is green rhinorrhea, more on right. Thicker in the morning.  Every day for last several months. All day.     A few days of right retro-orbital pain, but not frequent.     Denies nasal obstruction or anosmia.     Tried a nasal spray, but not routinely.     All started shortly after wisdom tooth extraction in May. Has had three rounds of antibiotics. Did not resolved on shorter course of abx.     Denies history of allergies.      Return clinic visit, 1/23/23    COVID 2 weeks ago. URI symptoms resolved. Since then purulent rhinorrhea, nasal obstruction, hyposmia. Mucinex, tylenol.    Return clinic visit, 2/19/24  Current symptoms in nasal obstruction (bilateral), purulent rhinorrhea, facial pressure, anosmia.   Symptoms present for 2 years now, progressively worsening. Minimal improvement on two rounds of 21 day courses of antibiotics and long course of steroids.   He hsa used saline rinses and flonase for several months without improvement.           History      Past Medical History:   Past Medical History:   Diagnosis Date    GERD (gastroesophageal reflux disease)     Sleep apnea      "     Past Surgical History:No past surgical history on file.      Medications: Medication list reviewed. He  has a current medication list which includes the following prescription(s): metformin, amoxicillin-clavulanate 875-125mg, and prednisone, and the following Facility-Administered Medications: lidocaine (pf) 10 mg/ml (1%).     Allergies: Review of patient's allergies indicates:  No Known Allergies      Family history: family history includes No Known Problems in his father and mother.         Social History          Alcohol use:  reports current alcohol use.            Tobacco:  reports that he has been smoking cigarettes. He has never used smokeless tobacco.         Physical Examination      Vitals: Temperature 98.1 °F (36.7 °C), temperature source Temporal, height 5' 9" (1.753 m), weight 134 kg (295 lb 6.7 oz).      General: Well developed, well nourished, well hydrated.     Voice: no dysphonia, no dysarthria      Head/Face: Normocephalic, atraumatic. No scars or lesions. Facial musculature equal.     Eyes: No scleral icterus or conjunctival hemorrhage. EOMI. PERRLA.     Ears:     Right ear: No gross deformity. EAC is clear of debris and erythema. TM are intact with a pneumatized middle ear. No signs of retraction, fluid or infection.      Left ear: No gross deformity. EAC is clear of debris and erythema. TM are intact with a pneumatized middle ear. No signs of retraction, fluid or infection.      Nose: No gross deformity or lesions. Severely congested. Severe right septal deviation and inferior turbinate hypertrophy . See procedure note.    Mouth/Oropharynx: Lips without any lesions. No mucosal lesions within the oropharynx. No tonsillar exudate or lesions. Pharyngeal walls symmetrical. Uvula midline. Tongue midline without lesions.     Neurologic: Moving all extremities without gross abnormality.CN II-XII grossly intact. House-Brackmann 1/6. No signs of nystagmus.          Data reviewed      Review of " records:      I reviewed records from the referring provider's office visits describing the history, workup, and/or treatment of this problem thus far.     Images    I have independently reviewed the following imaging:    CT sinus 2/19/24  V        Complete obstruction with bony remodeling an osteotic changes in the bilateral maxillary, anterior and posterior ethmoids, bilateral frontal, and right sphenoid sinuses. Partial left sphenoid opacification.   Bony dehiscence of left frontal sinus, superior orbital rim  Right septal deviation and spur  Inferior turbinate hypertrophy   Nasal polyposis in middle meatus    Procedures:  NASAL ENDOSCOPY (prior)       Indication: Manolo Osman is a 43 y.o. male  with sinonasal symptoms that were not able to be explained by anterior rhinoscopy alone, thus necessitating nasal endoscopy.     Procedure: Risks, benefits, and alternatives of the procedure were discussed with the patient, and the patient consented to the nasal endoscopy.  The nasal cavity was sprayed with a topical decongestant and anesthetic (if needed). The endoscope was passed into each nostril and each nasal cavity was visualized.  On each side the nasal cavity, sinuses (if open), turbinates, middle and superior meatus, sphenoethmoidal recess and septum were examined with the findings described below. At the end of the examination, the scope was removed. The patient tolerated the procedure well with no complications.       Endoscopic Sinonasal Exam Findings:  -     The right side has marked edema with polypoid changes, danuta polyposis from middle meatus into nasal cavity  -     The left side has marked edema with polypoid changes, danuta polyposis from middle meatus into nasal cavity  -     Nasal secretions: purulent secretions bilaterally  -     Nasal septum: RIGHT moderate deviation   -     Inferior turbinate: hypertrophy or edema (Moderate) bilaterally  -     Middle turbinate: hypertrophy or edema (Severe) and  polypoid changes bilaterally  -     Other findings: none      Assessment/Plan:    1. Chronic pansinusitis    2. Nasal polyposis    3. Allergic rhinitis, unspecified seasonality, unspecified trigger    4. Hypertrophy of both inferior nasal turbinates    5. Nasal septal deviation    6. Nasal obstruction         Manolo has severe chronic sinusitis with polyps and has been on maximal medical therapy as outlined in the HPI.  My recommendation is for endoscopic sinus surgery including bilateral full FESS, septoplasty and inferior turbinate reduction.  We discussed the need for postoperative debridements as well as chronic allergy management postoperatively.    Risks of sinus surgery were discussed including, but not limited to bleeding, infection, scar, lack of improvement or recurrence of symptoms, cerebrospinal fluid leak, injury to eyes. Additional risks of septoplasty include septal perforation, upper teeth and gum numbness, and nasal collapse were discussed.    Start steroids/abx 7 days prior to surgery.      Of note, there is Bony dehiscence of left frontal sinus/superior orbital rim - high risk for Pots. Will need to ensure minimal irrigation during procedure.        Thomas Roman MD  [facility] Department of Otolaryngology   [facility] - [address]  [place] [address]  P: [phone]  F: [phone]

## 2024-03-26 ENCOUNTER — PATIENT MESSAGE (OUTPATIENT)
Dept: PREADMISSION TESTING | Facility: HOSPITAL | Age: 44
End: 2024-03-26
Payer: COMMERCIAL

## 2024-04-10 ENCOUNTER — OFFICE VISIT (OUTPATIENT)
Dept: INTERNAL MEDICINE | Facility: CLINIC | Age: 44
End: 2024-04-10
Payer: COMMERCIAL

## 2024-04-10 ENCOUNTER — PATIENT MESSAGE (OUTPATIENT)
Dept: PREADMISSION TESTING | Facility: HOSPITAL | Age: 44
End: 2024-04-10
Payer: COMMERCIAL

## 2024-04-10 VITALS
OXYGEN SATURATION: 95 % | SYSTOLIC BLOOD PRESSURE: 143 MMHG | WEIGHT: 296.88 LBS | HEART RATE: 97 BPM | DIASTOLIC BLOOD PRESSURE: 83 MMHG | RESPIRATION RATE: 19 BRPM | TEMPERATURE: 98 F | BODY MASS INDEX: 43.97 KG/M2 | HEIGHT: 69 IN

## 2024-04-10 DIAGNOSIS — Z01.818 PREOP EXAMINATION: Primary | ICD-10-CM

## 2024-04-10 DIAGNOSIS — G47.33 OSA (OBSTRUCTIVE SLEEP APNEA): ICD-10-CM

## 2024-04-10 DIAGNOSIS — J32.4 CHRONIC PANSINUSITIS: ICD-10-CM

## 2024-04-10 DIAGNOSIS — R73.03 PREDIABETES: ICD-10-CM

## 2024-04-10 DIAGNOSIS — Z01.818 PRE-OP EXAM: ICD-10-CM

## 2024-04-10 DIAGNOSIS — J34.3 HYPERTROPHY OF BOTH INFERIOR NASAL TURBINATES: ICD-10-CM

## 2024-04-10 DIAGNOSIS — E66.01 MORBID OBESITY WITH BMI OF 40.0-44.9, ADULT: ICD-10-CM

## 2024-04-10 DIAGNOSIS — K21.9 GASTROESOPHAGEAL REFLUX DISEASE WITHOUT ESOPHAGITIS: ICD-10-CM

## 2024-04-10 DIAGNOSIS — J34.2 NASAL SEPTAL DEVIATION: ICD-10-CM

## 2024-04-10 DIAGNOSIS — J34.89 NASAL OBSTRUCTION: ICD-10-CM

## 2024-04-10 PROCEDURE — 99499 UNLISTED E&M SERVICE: CPT | Mod: S$GLB,,, | Performed by: ANESTHESIOLOGY

## 2024-04-10 PROCEDURE — 99999 PR PBB SHADOW E&M-EST. PATIENT-LVL IV: CPT | Mod: PBBFAC,,,

## 2024-04-10 NOTE — PROGRESS NOTES
Preoperative History and Physical  [facility]                                                                   Chief Complaint: Preoperative evaluation     History of Present Illness:      Manolo Osman is a 43 y.o. male with a past medical history of morbid obesity, GERD, and chronic pansinusitis who presents to the office today for a preoperative consultation at the request of Dr. Roman who plans on performing FESS with nasal septoplasty and turbinate reduction on April 17.     Functional Status:      The patient is able to climb a flight of stairs. The patient is able to ambulate without difficulty. The patient's functional status is affected by the surgical problem. The patient's functional status is not affected by shortness of breath, chest pain, dyspnea on exertion and fatigue.      MET score greater than 4.    Patient Anesthesia History:      Has never received anesthesia.     Family Anesthesia History:      History of Malignant Hyperthermia: no  History of Pseudocholinesterase Deficiency: no    Past Medical History:      Past Medical History:   Diagnosis Date    GERD (gastroesophageal reflux disease)     Morbid obesity with BMI of 40.0-44.9, adult     Prediabetes     Sleep apnea         Past Surgical History:      History reviewed. No pertinent surgical history.     Social History:      Social History     Socioeconomic History    Marital status:    Tobacco Use    Smoking status: Some Days     Types: Cigarettes    Smokeless tobacco: Never    Tobacco comments:     Smokes 2 cigarettes a day.   Substance and Sexual Activity    Alcohol use: Yes     Comment: Occasional    Drug use: No    Sexual activity: Yes     Partners: Female     Birth control/protection: None     Social Determinants of Health     Financial Resource Strain: Low Risk  (1/6/2024)    Overall Financial Resource Strain (CARDIA)     Difficulty of Paying Living Expenses: Not hard at all    Food Insecurity: No Food Insecurity (1/6/2024)    Hunger Vital Sign     Worried About Running Out of Food in the Last Year: Never true     Ran Out of Food in the Last Year: Never true   Transportation Needs: No Transportation Needs (1/6/2024)    PRAPARE - Transportation     Lack of Transportation (Medical): No     Lack of Transportation (Non-Medical): No   Physical Activity: Patient Declined (1/6/2024)    Exercise Vital Sign     Days of Exercise per Week: Patient declined     Minutes of Exercise per Session: Patient declined   Stress: Stress Concern Present (1/6/2024)    [facility] of Occupational Health - Occupational Stress Questionnaire     Feeling of Stress : Very much   Social Connections: Unknown (1/6/2024)    Social Connection and Isolation Panel [NHANES]     Frequency of Communication with Friends and Family: Three times a week     Frequency of Social Gatherings with Friends and Family: Once a week     Active Member of Clubs or Organizations: No     Attends Club or Organization Meetings: Never     Marital Status:    Housing Stability: Unknown (1/6/2024)    Housing Stability Vital Sign     Unable to Pay for Housing in the Last Year: No     Unstable Housing in the Last Year: No        Family History:      Family History   Problem Relation Age of Onset    Suicide Mother     No Known Problems Father     No Known Problems Sister     No Known Problems Sister     No Known Problems Sister     No Known Problems Brother     No Known Problems Maternal Grandmother     No Known Problems Maternal Grandfather     No Known Problems Paternal Grandmother     No Known Problems Paternal Grandfather        Allergies:      Review of patient's allergies indicates:  No Known Allergies    Medications:      Current Outpatient Medications   Medication Sig    metFORMIN (GLUCOPHAGE) 500 MG tablet Take 1 tablet (500 mg total) by mouth 2 (two) times daily with meals.    predniSONE (DELTASONE) 10 MG tablet Take 3 tablets a  day for 4 days (1 before breakfast, 1 after lunch, 1 before bed). Then take 2 tablets a day for 4 days (1 before breakfast, 1 before bed). Then take 1 tablet a day for 4 days (1 before breakfast). START 7 DAYS PRIOR TO SURGERY (Patient not taking: Reported on 4/10/2024)     Current Facility-Administered Medications   Medication    LIDOcaine (PF) 10 mg/ml (1%) injection 10 mg       Vitals:      Vitals:    04/10/24 1412   BP: (!) 143/83   Pulse: 97   Resp: 19   Temp: 97.9 °F (36.6 °C)       Review of Systems:        Constitutional: Negative for fever, chills, weight loss, malaise/fatigue and diaphoresis.   HENT: Negative for hearing loss, ear pain, nosebleeds, sore throat, neck pain, tinnitus and ear discharge.  Positive for sinus congestion and fullness with difficulty breathing through nose.  Eyes: Negative for blurred vision, double vision, photophobia, pain, discharge and redness.   Respiratory: Negative for cough, hemoptysis, sputum production, shortness of breath, wheezing and stridor.    Cardiovascular: Negative for chest pain, palpitations, orthopnea, claudication, leg swelling and PND.   Gastrointestinal: Negative for heartburn, nausea, vomiting, abdominal pain, diarrhea, constipation, blood in stool and melena.   Genitourinary: Negative for dysuria, urgency, frequency, hematuria and flank pain.   Musculoskeletal: Negative for myalgias, back pain, joint pain and falls.   Skin: Negative for itching and rash.   Neurological: Negative for dizziness, tingling, tremors, sensory change, speech change, focal weakness, seizures, loss of consciousness, weakness and headaches.   Endo/Heme/Allergies: Negative for environmental allergies and polydipsia. Does not bruise/bleed easily.   Psychiatric/Behavioral: Negative for depression, suicidal ideas, hallucinations, memory loss and substance abuse. The patient is not nervous/anxious and does not have insomnia.    All 14 systems reviewed and negative except as noted  "above.    Physical Exam:      Constitutional: Appears well-developed, well-nourished and in no acute distress.  Patient is oriented to person, place, and time.   Head: Normocephalic and atraumatic. Mucous membranes moist.  Neck: Neck supple no mass.   Cardiovascular: Normal rate and regular rhythm.  S1 S2 appreciated by ascultation.  Pulmonary/Chest: Effort normal and clear to auscultation bilaterally. No respiratory distress.   Abdomen: Soft. Non-tender and non-distended. Bowel sounds are normal.   Neurological: Patient is alert and oriented to person, place and time. Moves all extremities.  Skin: Warm and dry. No lesions.  Extremities: No clubbing, cyanosis or edema.    Laboratory data:      Reviewed and noted in plan where applicable. Please see chart for full laboratory data.    @SOASLOVWF50(cpk,cpkmb,troponini,mb)@ @XLNMTIOOD65(poctglucose)@     No results found for: "INR", "PROTIME"    Lab Results   Component Value Date    WBC 10.66 01/29/2024    HGB 14.5 01/29/2024    HCT 45.0 01/29/2024    MCV 91 01/29/2024     01/29/2024       @YECINHBJW50(GLU,NA,K,Cl,CO2,BUN,Creatinine,Calcium,MG)@    Predictors of intubation difficulty:       Morbid obesity? yes    Anatomically abnormal facies? no   Prominent incisors? no   Receding mandible? no   Short, thick neck? yes    Neck range of motion: normal   Dentition: No chipped, loose, or missing teeth.  Based on the Modified Mallampati, patient is a mallampati score: III (soft and hard palate and base of uvula visible)    Cardiographics:      Stress ECG in January showed:       The patient exercised for 7 minutes 12 seconds on a Mook protocol, corresponding to a functional capacity of 10 METS, achieving a peak heart rate of 173 bpm, which is 98 % of the age predicted maximum heart rate.    The ECG portion of the study is negative for ischemia.    The patient reported no chest pain during the stress test.    There were no arrhythmias during stress.    Stress ECG: " There is hypertensive blood pressure response with stress.    Echocardiogram in 2023 showed: Concentric remodeling and normal systolic function.  The estimated ejection fraction is 60%.  Normal left ventricular diastolic function.  Normal right ventricular size with normal right ventricular systolic function.  Normal central venous pressure (3 mmHg).  The estimated PA systolic pressure is 32 mmHg.    Imaging:      Chest x-ray: not indicated    Assessment and Plan:      Preop examination  - patient presents today at the request of Dr. Roman who plans on performing a FESS with nasal septoplasty and turbinate reductionon 4/17.    Known risk factors for perioperative complications: Morbid Obesity  Difficulty with intubation is not anticipated.    Cardiac Risk Estimation: Based on the Revised Cardiac Risk index, patient is a Class 1 risk with a 3.9% risk of a major cardiac event in a low risk procedure.    1.) Preoperative workup as follows: ECG, hemoglobin, hematocrit, electrolytes, creatinine, glucose, liver function studies.  2.) Change in medication regimen before surgery: discontinue ASA 6 days before surgery, discontinue NSAIDs 5 days before surgery, hold Metformin 24 hours prior to surgery.  3.) Prophylaxis for cardiac events with perioperative beta-blockers: not indicated.  4.) Invasive hemodynamic monitoring perioperatively: not indicated.  5.) Deep vein thrombosis prophylaxis postoperatively: intermittent pneumatic compression boots and regimen to be chosen by surgical team.  6.) Surveillance for postoperative MI with ECG immediately postoperatively and on postoperati ve days 1 and 2 AND troponin levels 24 hours postoperatively and on day 4 or hospital discharge (whichever comes first): not indicated.  7.) Current medications which may produce withdrawal symptoms if withheld perioperatively: None.  8.) Other measures:  None.      Chronic pansinusitis  - to undergo FESS.  - see plan as per  above.    Hypertrophy of both inferior nasal turbinates  - to undergo FESS.  - see plan as per above.    Nasal septal deviation  - to undergo FESS.  - see plan as per above.    Nasal obstruction  - to undergo FESS.  - see plan as per above.    JAVED (obstructive sleep apnea)  - Has not obtained CPAP as of yet.  - Continue outpatient f/u with Pulmonary as directed.    GERD (gastroesophageal reflux disease)  - Not currently on any medication.    Morbid obesity with BMI of 40.0-44.9, adult  - BMI as of today 43.84.  - Self directed diet and weight loss.    Prediabetes  - A1c 5.6 in January.  - instructed to hold metformin for 24 hours prior to surgery and resume postoperatively as directed.        Electronically signed by Nidia Abdul DNP, ACNP on 4/10/2024 at 2:09 PM.

## 2024-04-10 NOTE — DISCHARGE INSTRUCTIONS
To confirm, your doctor has instructed you: Surgery is scheduled for 04/17/24.   Pre admit office will call the afternoon prior to surgery between 1PM and 3PM with arrival time.    Surgery will be at [facility],  The address is [address]. [place] [address].      IMPORTANT INSTRUCTIONS!    Do not eat or drink after 12 midnight, including water. Do not smoke or use chewing tobacco after 12 midnight!  OK to brush teeth, but no gum, candy, or mints!      *Take only these medicines with a small swallow of water-morning of surgery*     none         ____ Stop taking all vitamins, herbal supplements, Aspirin, & NSAIDS (Ibuprofen, Advil, Aleve) 7 days prior to surgery, as these can thin your blood.    ____ Weight loss medication, such as Adipex and Phentermine, must be stopped 14 days prior to surgery, no exceptions!    *Diabetic Patients: If you take diabetic or weight loss medication, do NOT take morning of surgery unless instructed by Doctor. Metformin to be stopped 24 hrs prior to surgery. DO NOT take short-acting insulin the day of surgery. Only take HALF of your regular dose of long-acting insulin the night before surgery, unless instructed otherwise. Blood sugars will be checked in pre-op.   ~Ozempic/Mounjaro/Wegovy/Trulicity/Semaglutide injections must be stopped 7 days prior to surgery.     Please notify MD office if you develop an active infection, are prescribed antibiotics by someone other than the surgeon doing your surgery, or visit urgent care/ER.      Bathing Instructions:   The night before surgery and the morning prior to coming to the hospital:    - Shower & rinse your body as usual with anti-bacterial Soap (Dial or Lever 2000)   -Hibiclens (if indicated) use AFTER anti-bacterial soap; 1 packet PM/1 packet in AM on surgical site only   -Do not use hibiclens on your head, face, or genitals.    -Do not wash with anti-bacterial soap after you use the hibiclens.    -Do not shave  surgical site 5-7 days prior to surgery.    -Pubic hair 7 days prior to surgery (OBGYN/Urology only).       Additional Instructions:   __ No powder, lotions, creams, or body spray to skin   __ No deodorant if you are having: breast procedure, PORT, or upper shoulder surgery!   __ No nail polish or artificial nails       **SURGERY WILL BE CANCELLED IF ARTIFICIAL/NAIL POLISH IS PRESENT!!!**  __ Please remove all piercings and leave all jewelry at home.    **SURGERY WILL BE CANCELLED IF PIERCINGS ARE PRESENT!!!**    __ Dentures, Hearing Aids and Contact Lens need to be removed prior to the start of surgery.    __ Avoid Alcoholic beverages 3 days prior to surgery, as it can thin the blood, unless told otherwise by pre-admit department.  __ Females: may need to give a urine sample the morning of surgery;   **Please ask  for a specimen cup if you need to use the restroom prior to being called into pre-op.**  __ Males: Stop ED medications (Viagra, Cialis) 24 hrs prior to surgery.  __ Wear clean, loose-fitting clothing. Allow for dressings/bandages/surgical equipment   __ You must have transportation, and they MUST stay the entire time.   __  Bring photo ID and insurance information to [facility] Visitor/Ride Policy:   Only 1 adult allowed (over the age of 18) to accompany you and MUST STAY through the entire length of admission.     -Must have a ride home from a responsible adult that you know and trust.    -Medical Transport, Uber or Lyft can only be used if patient has a responsible adult to accompany them during ride home.    ~Your ride MUST STAY the entire time until you are discharged~        Post-Op Instructions: You will receive surgery post-op instructions by your Discharge Nurse prior to going home.   Surgical Site Infection:   Prevention of surgical site infections:   -Keep incisions clean and dry.   -Do not soak/submerge incisions in water until completely healed.   -Do not apply lotions,  powders, creams, or deodorants to site.   -Always make sure hands are cleaned with antibacterial soap/ alcohol-based  prior to touching the surgical site.       Signs and symptoms:               -Redness and pain around the area where you had surgery               -Drainage of cloudy fluid from your surgical wound               -Fever over 100.4 or chills     >>>Call Surgeon office/on-call Surgeon if you experience any of these signs & symptoms post-surgery @ [phone].    *If you are running late or have questions the morning of surgery before 7AM, please call the Pre-OP Department @ [phone].    *Please Call [facility] Pre-Admit Department for surgery instruction questions:  [phone]    *Payment questions:  [phone]    *Billing questions:  [phone]

## 2024-04-10 NOTE — ASSESSMENT & PLAN NOTE
- patient presents today at the request of Dr. Roman who plans on performing a FESS with nasal septoplasty and turbinate reductionon 4/17.    Known risk factors for perioperative complications: Morbid Obesity  Difficulty with intubation is not anticipated.    Cardiac Risk Estimation: Based on the Revised Cardiac Risk index, patient is a Class 1 risk with a 3.9% risk of a major cardiac event in a low risk procedure.    1.) Preoperative workup as follows: ECG, hemoglobin, hematocrit, electrolytes, creatinine, glucose, liver function studies.  2.) Change in medication regimen before surgery: discontinue ASA 6 days before surgery, discontinue NSAIDs 5 days before surgery, hold Metformin 24 hours prior to surgery.  3.) Prophylaxis for cardiac events with perioperative beta-blockers: not indicated.  4.) Invasive hemodynamic monitoring perioperatively: not indicated.  5.) Deep vein thrombosis prophylaxis postoperatively: intermittent pneumatic compression boots and regimen to be chosen by surgical team.  6.) Surveillance for postoperative MI with ECG immediately postoperatively and on postoperati ve days 1 and 2 AND troponin levels 24 hours postoperatively and on day 4 or hospital discharge (whichever comes first): not indicated.  7.) Current medications which may produce withdrawal symptoms if withheld perioperatively: None.  8.) Other measures:  None.

## 2024-04-10 NOTE — ASSESSMENT & PLAN NOTE
- A1c 5.6 in January.  - instructed to hold metformin for 24 hours prior to surgery and resume postoperatively as directed.

## 2024-04-16 ENCOUNTER — TELEPHONE (OUTPATIENT)
Dept: PREADMISSION TESTING | Facility: HOSPITAL | Age: 44
End: 2024-04-16
Payer: COMMERCIAL

## 2024-04-16 ENCOUNTER — ANESTHESIA EVENT (OUTPATIENT)
Dept: SURGERY | Facility: HOSPITAL | Age: 44
End: 2024-04-16
Payer: COMMERCIAL

## 2024-04-16 NOTE — ANESTHESIA PREPROCEDURE EVALUATION
04/16/2024  Manolo Osman is a 43 y.o., male.      Pre-op Assessment    I have reviewed the Patient Summary Reports.    I have reviewed the NPO Status.   I have reviewed the Medications.     Review of Systems  Anesthesia Hx:  No problems with previous Anesthesia   Neg history of prior surgery.          Denies Family Hx of Anesthesia complications.    Denies Personal Hx of Anesthesia complications.                    Social:  Smoker       Hematology/Oncology:  Hematology Normal                                     Cardiovascular:  Cardiovascular Normal                                            Pulmonary:        Sleep Apnea           Education provided regarding risk of obstructive sleep apnea            Renal/:  Renal/ Normal                 Hepatic/GI:     GERD             Endocrine:  Diabetes         Morbid Obesity / BMI > 40      Physical Exam  General: Alert and Oriented    Airway:  Mallampati: II   Mouth Opening: Normal  TM Distance: Normal  Tongue: Normal  Neck ROM: Normal ROM    Dental:  Intact    Chest/Lungs:  Clear to auscultation, Normal Respiratory Rate    Heart:  Rate: Normal  Rhythm: Regular Rhythm        Anesthesia Plan  Type of Anesthesia, risks & benefits discussed:    Anesthesia Type: Gen ETT  Intra-op Monitoring Plan: Standard ASA Monitors  Post Op Pain Control Plan: multimodal analgesia and IV/PO Opioids PRN  Induction:  IV  Informed Consent: Informed consent signed with the Patient and all parties understand the risks and agree with anesthesia plan.  All questions answered. Patient consented to blood products? No  ASA Score: 2  Day of Surgery Review of History & Physical: H&P Update referred to the surgeon/provider.    Ready For Surgery From Anesthesia Perspective.     .

## 2024-04-17 ENCOUNTER — TELEPHONE (OUTPATIENT)
Dept: OTOLARYNGOLOGY | Facility: CLINIC | Age: 44
End: 2024-04-17

## 2024-04-17 ENCOUNTER — PATIENT MESSAGE (OUTPATIENT)
Dept: SURGERY | Facility: HOSPITAL | Age: 44
End: 2024-04-17
Payer: COMMERCIAL

## 2024-04-17 ENCOUNTER — HOSPITAL ENCOUNTER (OUTPATIENT)
Facility: HOSPITAL | Age: 44
Discharge: HOME | End: 2024-04-18
Attending: STUDENT IN AN ORGANIZED HEALTH CARE EDUCATION/TRAINING PROGRAM | Admitting: STUDENT IN AN ORGANIZED HEALTH CARE EDUCATION/TRAINING PROGRAM
Payer: COMMERCIAL

## 2024-04-17 ENCOUNTER — ANESTHESIA (OUTPATIENT)
Dept: SURGERY | Facility: HOSPITAL | Age: 44
End: 2024-04-17
Payer: COMMERCIAL

## 2024-04-17 DIAGNOSIS — J34.2 NASAL SEPTAL DEVIATION: ICD-10-CM

## 2024-04-17 DIAGNOSIS — J34.3 HYPERTROPHY OF BOTH INFERIOR NASAL TURBINATES: ICD-10-CM

## 2024-04-17 DIAGNOSIS — J34.89 NASAL OBSTRUCTION: ICD-10-CM

## 2024-04-17 DIAGNOSIS — Z01.818 PRE-OP EXAM: Primary | ICD-10-CM

## 2024-04-17 DIAGNOSIS — J32.4 CHRONIC PANSINUSITIS: ICD-10-CM

## 2024-04-17 DIAGNOSIS — J33.9 NASAL POLYPOSIS: ICD-10-CM

## 2024-04-17 LAB
ALLENS TEST: ABNORMAL
DELSYS: ABNORMAL
FIO2: 100
GLUCOSE SERPL-MCNC: 161 MG/DL (ref 70–110)
HCO3 UR-SCNC: 23.8 MMOL/L (ref 24–28)
HCT VFR BLD CALC: 40 %PCV (ref 36–54)
MODE: ABNORMAL
PCO2 BLDA: 65 MMHG (ref 35–45)
PH SMN: 7.17 [PH] (ref 7.35–7.45)
PO2 BLDA: 264 MMHG (ref 80–100)
POC BE: -5 MMOL/L
POC IONIZED CALCIUM: 1.16 MMOL/L (ref 1.06–1.42)
POC SATURATED O2: 100 % (ref 95–100)
POCT GLUCOSE: 90 MG/DL (ref 70–110)
POTASSIUM BLD-SCNC: 3.9 MMOL/L (ref 3.5–5.1)
SAMPLE: ABNORMAL
SITE: ABNORMAL
SODIUM BLD-SCNC: 135 MMOL/L (ref 136–145)
VT: 550

## 2024-04-17 PROCEDURE — 37000009 HC ANESTHESIA EA ADD 15 MINS: Performed by: STUDENT IN AN ORGANIZED HEALTH CARE EDUCATION/TRAINING PROGRAM

## 2024-04-17 PROCEDURE — 88305 TISSUE EXAM BY PATHOLOGIST: CPT | Mod: 26,,, | Performed by: PATHOLOGY

## 2024-04-17 PROCEDURE — 31276 NSL/SINS NDSC FRNT TISS RMVL: CPT | Mod: 50,51,, | Performed by: STUDENT IN AN ORGANIZED HEALTH CARE EDUCATION/TRAINING PROGRAM

## 2024-04-17 PROCEDURE — 31267 ENDOSCOPY MAXILLARY SINUS: CPT | Mod: 50,51,, | Performed by: STUDENT IN AN ORGANIZED HEALTH CARE EDUCATION/TRAINING PROGRAM

## 2024-04-17 PROCEDURE — 25000003 PHARM REV CODE 250: Performed by: STUDENT IN AN ORGANIZED HEALTH CARE EDUCATION/TRAINING PROGRAM

## 2024-04-17 PROCEDURE — 87075 CULTR BACTERIA EXCEPT BLOOD: CPT | Mod: 59 | Performed by: STUDENT IN AN ORGANIZED HEALTH CARE EDUCATION/TRAINING PROGRAM

## 2024-04-17 PROCEDURE — 88304 TISSUE EXAM BY PATHOLOGIST: CPT | Mod: 59 | Performed by: PATHOLOGY

## 2024-04-17 PROCEDURE — 36000709 HC OR TIME LEV III EA ADD 15 MIN: Performed by: STUDENT IN AN ORGANIZED HEALTH CARE EDUCATION/TRAINING PROGRAM

## 2024-04-17 PROCEDURE — 63600175 PHARM REV CODE 636 W HCPCS: Performed by: NURSE ANESTHETIST, CERTIFIED REGISTERED

## 2024-04-17 PROCEDURE — 25000003 PHARM REV CODE 250: Performed by: NURSE ANESTHETIST, CERTIFIED REGISTERED

## 2024-04-17 PROCEDURE — 88305 TISSUE EXAM BY PATHOLOGIST: CPT | Performed by: PATHOLOGY

## 2024-04-17 PROCEDURE — 30520 REPAIR OF NASAL SEPTUM: CPT | Mod: 51,,, | Performed by: STUDENT IN AN ORGANIZED HEALTH CARE EDUCATION/TRAINING PROGRAM

## 2024-04-17 PROCEDURE — 84132 ASSAY OF SERUM POTASSIUM: CPT

## 2024-04-17 PROCEDURE — 61782 SCAN PROC CRANIAL EXTRA: CPT | Mod: ,,, | Performed by: STUDENT IN AN ORGANIZED HEALTH CARE EDUCATION/TRAINING PROGRAM

## 2024-04-17 PROCEDURE — 36000708 HC OR TIME LEV III 1ST 15 MIN: Performed by: STUDENT IN AN ORGANIZED HEALTH CARE EDUCATION/TRAINING PROGRAM

## 2024-04-17 PROCEDURE — 37000008 HC ANESTHESIA 1ST 15 MINUTES: Performed by: STUDENT IN AN ORGANIZED HEALTH CARE EDUCATION/TRAINING PROGRAM

## 2024-04-17 PROCEDURE — 63600175 PHARM REV CODE 636 W HCPCS: Mod: JZ,JG | Performed by: NURSE ANESTHETIST, CERTIFIED REGISTERED

## 2024-04-17 PROCEDURE — 36600 WITHDRAWAL OF ARTERIAL BLOOD: CPT

## 2024-04-17 PROCEDURE — 87076 CULTURE ANAEROBE IDENT EACH: CPT | Performed by: STUDENT IN AN ORGANIZED HEALTH CARE EDUCATION/TRAINING PROGRAM

## 2024-04-17 PROCEDURE — 84295 ASSAY OF SERUM SODIUM: CPT

## 2024-04-17 PROCEDURE — 71000039 HC RECOVERY, EACH ADD'L HOUR: Performed by: STUDENT IN AN ORGANIZED HEALTH CARE EDUCATION/TRAINING PROGRAM

## 2024-04-17 PROCEDURE — 71000033 HC RECOVERY, INTIAL HOUR: Performed by: STUDENT IN AN ORGANIZED HEALTH CARE EDUCATION/TRAINING PROGRAM

## 2024-04-17 PROCEDURE — 27201423 OPTIME MED/SURG SUP & DEVICES STERILE SUPPLY: Performed by: STUDENT IN AN ORGANIZED HEALTH CARE EDUCATION/TRAINING PROGRAM

## 2024-04-17 PROCEDURE — 85014 HEMATOCRIT: CPT

## 2024-04-17 PROCEDURE — 82330 ASSAY OF CALCIUM: CPT

## 2024-04-17 PROCEDURE — 82803 BLOOD GASES ANY COMBINATION: CPT

## 2024-04-17 PROCEDURE — 31259 NSL/SINS NDSC SPHN TISS RMVL: CPT | Mod: 50,,, | Performed by: STUDENT IN AN ORGANIZED HEALTH CARE EDUCATION/TRAINING PROGRAM

## 2024-04-17 PROCEDURE — 88304 TISSUE EXAM BY PATHOLOGIST: CPT | Mod: 26,,, | Performed by: PATHOLOGY

## 2024-04-17 PROCEDURE — D9220A PRA ANESTHESIA: Mod: ,,, | Performed by: NURSE ANESTHETIST, CERTIFIED REGISTERED

## 2024-04-17 PROCEDURE — 63600175 PHARM REV CODE 636 W HCPCS: Performed by: STUDENT IN AN ORGANIZED HEALTH CARE EDUCATION/TRAINING PROGRAM

## 2024-04-17 PROCEDURE — 30140 RESECT INFERIOR TURBINATE: CPT | Mod: 50,51,, | Performed by: STUDENT IN AN ORGANIZED HEALTH CARE EDUCATION/TRAINING PROGRAM

## 2024-04-17 PROCEDURE — 87070 CULTURE OTHR SPECIMN AEROBIC: CPT | Performed by: STUDENT IN AN ORGANIZED HEALTH CARE EDUCATION/TRAINING PROGRAM

## 2024-04-17 PROCEDURE — 99900035 HC TECH TIME PER 15 MIN (STAT)

## 2024-04-17 RX ORDER — OXYMETAZOLINE HCL 0.05 %
SPRAY, NON-AEROSOL (ML) NASAL
Status: DISCONTINUED | OUTPATIENT
Start: 2024-04-17 | End: 2024-04-17 | Stop reason: HOSPADM

## 2024-04-17 RX ORDER — ROCURONIUM BROMIDE 10 MG/ML
INJECTION, SOLUTION INTRAVENOUS
Status: DISCONTINUED | OUTPATIENT
Start: 2024-04-17 | End: 2024-04-17

## 2024-04-17 RX ORDER — PROPOFOL 10 MG/ML
VIAL (ML) INTRAVENOUS
Status: DISCONTINUED | OUTPATIENT
Start: 2024-04-17 | End: 2024-04-17

## 2024-04-17 RX ORDER — FENTANYL CITRATE 50 UG/ML
INJECTION, SOLUTION INTRAMUSCULAR; INTRAVENOUS
Status: DISCONTINUED | OUTPATIENT
Start: 2024-04-17 | End: 2024-04-17

## 2024-04-17 RX ORDER — DIPHENHYDRAMINE HYDROCHLORIDE 50 MG/ML
25 INJECTION INTRAMUSCULAR; INTRAVENOUS EVERY 6 HOURS PRN
Status: DISCONTINUED | OUTPATIENT
Start: 2024-04-17 | End: 2024-04-17

## 2024-04-17 RX ORDER — LIDOCAINE HYDROCHLORIDE AND EPINEPHRINE 10; 10 MG/ML; UG/ML
INJECTION, SOLUTION INFILTRATION; PERINEURAL
Status: DISCONTINUED | OUTPATIENT
Start: 2024-04-17 | End: 2024-04-17 | Stop reason: HOSPADM

## 2024-04-17 RX ORDER — HYDROCODONE BITARTRATE AND ACETAMINOPHEN 5; 325 MG/1; MG/1
1 TABLET ORAL EVERY 4 HOURS PRN
Status: DISCONTINUED | OUTPATIENT
Start: 2024-04-17 | End: 2024-04-19 | Stop reason: HOSPADM

## 2024-04-17 RX ORDER — MEPERIDINE HYDROCHLORIDE 25 MG/ML
12.5 INJECTION INTRAMUSCULAR; INTRAVENOUS; SUBCUTANEOUS ONCE
Status: DISCONTINUED | OUTPATIENT
Start: 2024-04-17 | End: 2024-04-17

## 2024-04-17 RX ORDER — FENTANYL CITRATE 50 UG/ML
25 INJECTION, SOLUTION INTRAMUSCULAR; INTRAVENOUS EVERY 5 MIN PRN
Status: DISCONTINUED | OUTPATIENT
Start: 2024-04-17 | End: 2024-04-17

## 2024-04-17 RX ORDER — CEFAZOLIN SODIUM 1 G/3ML
INJECTION, POWDER, FOR SOLUTION INTRAMUSCULAR; INTRAVENOUS
Status: DISCONTINUED | OUTPATIENT
Start: 2024-04-17 | End: 2024-04-17

## 2024-04-17 RX ORDER — ALBUTEROL SULFATE 0.83 MG/ML
2.5 SOLUTION RESPIRATORY (INHALATION) EVERY 4 HOURS PRN
Status: DISCONTINUED | OUTPATIENT
Start: 2024-04-17 | End: 2024-04-17

## 2024-04-17 RX ORDER — OXYMETAZOLINE HCL 0.05 %
SPRAY, NON-AEROSOL (ML) NASAL
Status: DISPENSED
Start: 2024-04-17 | End: 2024-04-18

## 2024-04-17 RX ORDER — SUCCINYLCHOLINE CHLORIDE 20 MG/ML
INJECTION INTRAMUSCULAR; INTRAVENOUS
Status: DISCONTINUED | OUTPATIENT
Start: 2024-04-17 | End: 2024-04-17

## 2024-04-17 RX ORDER — BACITRACIN ZINC 500 UNIT/G
OINTMENT (GRAM) TOPICAL
Status: DISPENSED
Start: 2024-04-17 | End: 2024-04-18

## 2024-04-17 RX ORDER — HYDROCODONE BITARTRATE AND ACETAMINOPHEN 5; 325 MG/1; MG/1
1 TABLET ORAL EVERY 6 HOURS PRN
Qty: 20 TABLET | Refills: 0 | Status: SHIPPED | OUTPATIENT
Start: 2024-04-17

## 2024-04-17 RX ORDER — ESMOLOL HYDROCHLORIDE 10 MG/ML
INJECTION INTRAVENOUS
Status: DISCONTINUED | OUTPATIENT
Start: 2024-04-17 | End: 2024-04-17

## 2024-04-17 RX ORDER — ACETAMINOPHEN 325 MG/1
650 TABLET ORAL EVERY 6 HOURS PRN
Status: DISCONTINUED | OUTPATIENT
Start: 2024-04-17 | End: 2024-04-19 | Stop reason: HOSPADM

## 2024-04-17 RX ORDER — EPINEPHRINE 1 MG/ML
INJECTION INTRAMUSCULAR; INTRAVENOUS; SUBCUTANEOUS
Status: DISPENSED
Start: 2024-04-17 | End: 2024-04-18

## 2024-04-17 RX ORDER — HYDROCODONE BITARTRATE AND ACETAMINOPHEN 5; 325 MG/1; MG/1
1 TABLET ORAL
Status: DISCONTINUED | OUTPATIENT
Start: 2024-04-17 | End: 2024-04-17

## 2024-04-17 RX ORDER — DEXMEDETOMIDINE HYDROCHLORIDE 100 UG/ML
INJECTION, SOLUTION INTRAVENOUS
Status: DISCONTINUED | OUTPATIENT
Start: 2024-04-17 | End: 2024-04-17

## 2024-04-17 RX ORDER — PROCHLORPERAZINE EDISYLATE 5 MG/ML
5 INJECTION INTRAMUSCULAR; INTRAVENOUS EVERY 6 HOURS PRN
Status: DISCONTINUED | OUTPATIENT
Start: 2024-04-17 | End: 2024-04-19 | Stop reason: HOSPADM

## 2024-04-17 RX ORDER — ACETAMINOPHEN 10 MG/ML
INJECTION, SOLUTION INTRAVENOUS
Status: DISCONTINUED | OUTPATIENT
Start: 2024-04-17 | End: 2024-04-17

## 2024-04-17 RX ORDER — DEXAMETHASONE SODIUM PHOSPHATE 4 MG/ML
INJECTION, SOLUTION INTRA-ARTICULAR; INTRALESIONAL; INTRAMUSCULAR; INTRAVENOUS; SOFT TISSUE
Status: DISCONTINUED | OUTPATIENT
Start: 2024-04-17 | End: 2024-04-17

## 2024-04-17 RX ORDER — ONDANSETRON HYDROCHLORIDE 2 MG/ML
4 INJECTION, SOLUTION INTRAVENOUS EVERY 12 HOURS PRN
Status: DISCONTINUED | OUTPATIENT
Start: 2024-04-17 | End: 2024-04-19 | Stop reason: HOSPADM

## 2024-04-17 RX ORDER — IBUPROFEN 200 MG
400 TABLET ORAL EVERY 6 HOURS PRN
Status: DISCONTINUED | OUTPATIENT
Start: 2024-04-17 | End: 2024-04-19 | Stop reason: HOSPADM

## 2024-04-17 RX ORDER — EPINEPHRINE 0.1 MG/ML
INJECTION INTRAVENOUS
Status: DISCONTINUED | OUTPATIENT
Start: 2024-04-17 | End: 2024-04-17 | Stop reason: HOSPADM

## 2024-04-17 RX ORDER — PROPOFOL 10 MG/ML
VIAL (ML) INTRAVENOUS CONTINUOUS PRN
Status: DISCONTINUED | OUTPATIENT
Start: 2024-04-17 | End: 2024-04-17

## 2024-04-17 RX ORDER — ONDANSETRON HYDROCHLORIDE 2 MG/ML
INJECTION, SOLUTION INTRAVENOUS
Status: DISCONTINUED | OUTPATIENT
Start: 2024-04-17 | End: 2024-04-17

## 2024-04-17 RX ORDER — BACITRACIN ZINC 500 UNIT/G
OINTMENT (GRAM) TOPICAL
Status: DISCONTINUED | OUTPATIENT
Start: 2024-04-17 | End: 2024-04-17 | Stop reason: HOSPADM

## 2024-04-17 RX ORDER — ONDANSETRON HYDROCHLORIDE 2 MG/ML
4 INJECTION, SOLUTION INTRAVENOUS ONCE AS NEEDED
Status: DISCONTINUED | OUTPATIENT
Start: 2024-04-17 | End: 2024-04-17

## 2024-04-17 RX ORDER — MIDAZOLAM HYDROCHLORIDE 1 MG/ML
INJECTION INTRAMUSCULAR; INTRAVENOUS
Status: DISCONTINUED | OUTPATIENT
Start: 2024-04-17 | End: 2024-04-17

## 2024-04-17 RX ORDER — LABETALOL HYDROCHLORIDE 5 MG/ML
INJECTION, SOLUTION INTRAVENOUS
Status: DISCONTINUED | OUTPATIENT
Start: 2024-04-17 | End: 2024-04-17

## 2024-04-17 RX ORDER — LIDOCAINE HYDROCHLORIDE 20 MG/ML
INJECTION, SOLUTION EPIDURAL; INFILTRATION; INTRACAUDAL; PERINEURAL
Status: DISCONTINUED | OUTPATIENT
Start: 2024-04-17 | End: 2024-04-17

## 2024-04-17 RX ADMIN — ROCURONIUM BROMIDE 35 MG: 10 SOLUTION INTRAVENOUS at 12:04

## 2024-04-17 RX ADMIN — FENTANYL CITRATE 100 MCG: 50 INJECTION, SOLUTION INTRAMUSCULAR; INTRAVENOUS at 12:04

## 2024-04-17 RX ADMIN — DEXMEDETOMIDINE HYDROCHLORIDE 4 MCG: 100 INJECTION, SOLUTION INTRAVENOUS at 12:04

## 2024-04-17 RX ADMIN — ESMOLOL HYDROCHLORIDE 10 MG: 100 INJECTION, SOLUTION INTRAVENOUS at 12:04

## 2024-04-17 RX ADMIN — DEXAMETHASONE SODIUM PHOSPHATE 12 MG: 4 INJECTION, SOLUTION INTRA-ARTICULAR; INTRALESIONAL; INTRAMUSCULAR; INTRAVENOUS; SOFT TISSUE at 12:04

## 2024-04-17 RX ADMIN — SUGAMMADEX 400 MG: 100 INJECTION, SOLUTION INTRAVENOUS at 03:04

## 2024-04-17 RX ADMIN — DEXMEDETOMIDINE HYDROCHLORIDE 4 MCG: 100 INJECTION, SOLUTION INTRAVENOUS at 01:04

## 2024-04-17 RX ADMIN — LIDOCAINE HYDROCHLORIDE 70 MG: 20 INJECTION, SOLUTION EPIDURAL; INFILTRATION; INTRACAUDAL; PERINEURAL at 12:04

## 2024-04-17 RX ADMIN — ROCURONIUM BROMIDE 30 MG: 10 SOLUTION INTRAVENOUS at 01:04

## 2024-04-17 RX ADMIN — ACETAMINOPHEN 1000 MG: 10 INJECTION, SOLUTION INTRAVENOUS at 01:04

## 2024-04-17 RX ADMIN — PROPOFOL 200 MG: 10 INJECTION, EMULSION INTRAVENOUS at 12:04

## 2024-04-17 RX ADMIN — DEXMEDETOMIDINE HYDROCHLORIDE 4 MCG: 100 INJECTION, SOLUTION INTRAVENOUS at 02:04

## 2024-04-17 RX ADMIN — LABETALOL HYDROCHLORIDE 2.5 MG: 5 INJECTION, SOLUTION INTRAVENOUS at 01:04

## 2024-04-17 RX ADMIN — ONDANSETRON 4 MG: 2 INJECTION INTRAMUSCULAR; INTRAVENOUS at 02:04

## 2024-04-17 RX ADMIN — ACETAMINOPHEN 650 MG: 325 TABLET ORAL at 09:04

## 2024-04-17 RX ADMIN — SUCCINYLCHOLINE CHLORIDE 120 MG: 20 INJECTION, SOLUTION INTRAMUSCULAR; INTRAVENOUS; PARENTERAL at 12:04

## 2024-04-17 RX ADMIN — CEFAZOLIN 3 G: 330 INJECTION, POWDER, FOR SOLUTION INTRAMUSCULAR; INTRAVENOUS at 12:04

## 2024-04-17 RX ADMIN — MIDAZOLAM HYDROCHLORIDE 2 MG: 1 INJECTION, SOLUTION INTRAMUSCULAR; INTRAVENOUS at 12:04

## 2024-04-17 RX ADMIN — FENTANYL CITRATE 25 MCG: 50 INJECTION, SOLUTION INTRAMUSCULAR; INTRAVENOUS at 12:04

## 2024-04-17 RX ADMIN — ROCURONIUM BROMIDE 10 MG: 10 SOLUTION INTRAVENOUS at 12:04

## 2024-04-17 RX ADMIN — ESMOLOL HYDROCHLORIDE 10 MG: 100 INJECTION, SOLUTION INTRAVENOUS at 01:04

## 2024-04-17 RX ADMIN — FENTANYL CITRATE 50 MCG: 50 INJECTION, SOLUTION INTRAMUSCULAR; INTRAVENOUS at 12:04

## 2024-04-17 RX ADMIN — PROPOFOL 200 MCG/KG/MIN: 10 INJECTION, EMULSION INTRAVENOUS at 12:04

## 2024-04-17 RX ADMIN — ROCURONIUM BROMIDE 5 MG: 10 SOLUTION INTRAVENOUS at 12:04

## 2024-04-17 RX ADMIN — ROCURONIUM BROMIDE 20 MG: 10 SOLUTION INTRAVENOUS at 01:04

## 2024-04-17 NOTE — PLAN OF CARE
Reviewed and completed all PACU orders. I encouraged questions, answered them thoroughly, and evaluated my instructions via teach-back method. I have disconnected patient from monitoring equipment and prepared them for the next phase of care. Patient has met all PACU discharge criteria at this point. Patient and family agree with the plan of care. Report given to NURSE Dye.

## 2024-04-17 NOTE — CARE UPDATE
Received patient from Marnie RN, awake, alert and coherent. Nasal packing intact, no severe bleeding observed. Patient nurse in semi fowlers position, safety measures intact and ongoing. Patient is ambulatory from bed to bathroom with assistance. Denies any pain.Therapeutic environment provided. For continuity of care and management

## 2024-04-17 NOTE — DISCHARGE INSTRUCTIONS
Sinus Surgery  Discharge Instructions:     - If you were prescribed an antibiotic and steroid before surgery, be sure to complete the prescription after surgery. You may also be given a new prescription for an antibiotic and/or steroid. Refer to your After Visit Summary, and take as directed.  - Start saline spray as soon as you are not having any further bloody oozing. This may take a day or two. Please wait to resume other nasal sprays.    - Spray saline gently into the splints in each side of your nose to help keep them clear of crusting. This will help you breathe thrugh your nose until the splints are removed in clinic.     - You may have bloody mucous from your nose for a few days. This is expected. Please call if there is significant bleeding.   - No nose blowing, picking, strenuous activity, straining, or use of a straw. Sleep with the head of the bed elevated.    - if you need to sneeze, do so with your mouth open.  - There is packing placed high in the sinuses. At your follow up the nose will suctioned clear of crusting and packing.   - If you have splints in the nose, they will be removed at the time of your follow up as well.    Activity/Restrictions:    - Resume your regular activities, as tolerated   - Avoid heavy lifting, straining or strenuous activities until instructed otherwise     Diet:   - Resume your regular diet, as tolerated     Pain Instructions:   - Try using acetaminophen/Tylenol and ibuprofen/Motrin to control your pain. If this does not bring you relief or the pain remains severe, a stronger pain medication has been prescribed to you.   - DO NOT MIX NARCOTIC PAIN MEDICATIONS OR TAKE NARCOTIC PRESCRIPTIONS AT THE SAME TIME (PERCODET, LORTAB, ROXICODONE, ETC.)   - DO NOT DRIVE OR OPERATE HEAVY MACHINERY WHILE ON NARCOTICS    - DO NOT TAKE MORE THAN 4 GRAMS (4000mg) OF TYLENOL (ACETAMINOPHEN) IN 24 HOURS     Follow Up:    - A follow up appointment has been scheduled for you as outlined  below:   Future Appointments   Date Time Provider Department Center   4/22/2024 11:15 AM Thomas Roman MD ON ENT  [facility]       Call your doctor or go to the emergency room if you have:    - Fever of 101.5 degrees or higher   - Severe pain that has increased greatly since your surgery or is uncontrolled by your current pain medications   - Heavy nose bleeding with bright red blood or large blood clots  - Nausea and vomiting that does not go away   - Chest pain/shortness of breath   - Any other acute events, problems, or concerns     For any questions, please call our clinic our leave us a My Chart message. [facility]: [phone], then ask for ENT Clinic.   For after hours questions and/or urgent concerns, call the same number above ([phone]) and ask for the on-call ENT physician.

## 2024-04-17 NOTE — TRANSFER OF CARE
"Anesthesia Transfer of Care Note    Patient: Manolo Osman    Procedure(s) Performed: Procedure(s) (LRB):  FESS, WITH NASAL SEPTOPLASTY AND TURBINATE REDUCTION (Bilateral)    Patient location: PACU    Anesthesia Type: general    Transport from OR: Transported from OR on 6-10 L/min O2 by face mask with adequate spontaneous ventilation    Post pain: adequate analgesia    Post assessment: no apparent anesthetic complications    Post vital signs: stable    Level of consciousness: agitated and awake    Nausea/Vomiting: no nausea/vomiting    Complications: none    Transfer of care protocol was followed      Last vitals: Visit Vitals  /65 (BP Location: Right arm, Patient Position: Lying)   Pulse 99   Temp 36.7 °C (98.1 °F) (Temporal)   Resp (!) 24   Ht 5' 9" (1.753 m)   Wt 135.4 kg (298 lb 8.1 oz)   SpO2 98%   BMI 44.08 kg/m²     "

## 2024-04-17 NOTE — ANESTHESIA POSTPROCEDURE EVALUATION
Anesthesia Post Evaluation    Patient: Manolo Osman    Procedure(s) Performed: Procedure(s) (LRB):  FESS, WITH NASAL SEPTOPLASTY AND TURBINATE REDUCTION (Bilateral)    Final Anesthesia Type: general      Patient location during evaluation: PACU  Patient participation: Yes- Able to Participate  Level of consciousness: awake and alert and oriented  Post-procedure vital signs: reviewed and stable  Pain management: adequate  Airway patency: patent    PONV status at discharge: No PONV  Anesthetic complications: no      Cardiovascular status: blood pressure returned to baseline, stable and hemodynamically stable  Respiratory status: unassisted  Hydration status: euvolemic  Follow-up not needed.              Vitals Value Taken Time   /64 04/17/24 1624   Temp 36.7 °C (98.1 °F) 04/17/24 1608   Pulse 100 04/17/24 1624   Resp 23 04/17/24 1624   SpO2 100 % 04/17/24 1624   Vitals shown include unfiled device data.      No case tracking events are documented in the log.      Pain/Linda Score: Linda Score: 6 (4/17/2024  4:15 PM)

## 2024-04-17 NOTE — ANESTHESIA PROCEDURE NOTES
Intubation    Date/Time: 4/17/2024 12:19 PM    Performed by: Dell Canas CRNA  Authorized by: Lisseth Almaguer MD    Intubation:     Induction:  Intravenous    Intubated:  Postinduction    Mask Ventilation:  Easy mask    Attempts:  1    Attempted By:  CRNA    Method of Intubation:  Video laryngoscopy    Blade:  [place] 3    Laryngeal View Grade: Grade IIA - cords partially seen      Difficult Airway Encountered?: No      Complications:  None    Airway Device:  Oral endotracheal tube    Airway Device Size:  7.5    Style/Cuff Inflation:  Cuffed (inflated to minimal occlusive pressure)    Inflation Amount (mL):  7    Tube secured:  21    Secured at:  The lips    Placement Verified By:  Capnometry    Complicating Factors:  Small mouth, obesity and poor neck/head extension    Findings Post-Intubation:  BS equal bilateral and atraumatic/condition of teeth unchanged

## 2024-04-17 NOTE — PROGRESS NOTES
Pt transferred to MS room 4 from PACU for overnight stay. Fess/septoplasty with moustache dressing, no bleeding noted. VSS on 2LNC. Pt sleeping but arousable to voice. Call light within reach, will continue to monitor.

## 2024-04-17 NOTE — OP NOTE
DATE OF PROCEDURE: 4/17/2024     PRE-OPERATIVE DIAGNOSIS:   Chronic rhinosinusitis with nasal polyposis     Nasal obstruction   Septal deviation   Inferior turbinate hypertrophy      POST-OPERATIVE DIAGNOSIS:   Same      PROCEDURE:   Septoplasty   Nasal endoscopy with nasal polypectomy   Bilateral inferior turbinate submucosal resection  Bilateral nasal endoscopy with maxillary antrostomy with removal of tissue     Bilateral nasal endoscopy with total ethmoidectomy and sphenoidotomy with removal of tissue   Bilateral nasal endoscopy with frontal sinusotomy and frontal sinus exploration   Functional endoscopic sinus surgery with CT image guided electromagnetic system    SURGEON:   Thomas Roman MD     ANESTHESIA:   General endotracheal anesthesia        ESTIMATED BLOOD LOSS:   100 mL      SPECIMENS:   Right maxillary sinus  Left maxillary sinus   Right nasal polyps  Left nasal polyps  Aerobic and aerobic cultures from bilateral frontal sinuses       COMPLICATIONS:   None        OPERATIVE INDICATIONS:   Manolo Osman is a 43 y.o. male with a history of chronic rhinosinusitis with nasal polyposis and nasal obstruction secondary to nasal septal deviation and inferior turbinate hypertrophy. He presents today for endoscopic sinus surgery, septoplasty and inferior turbinate reduction.         OPERATIVE FINDINGS:   -     The right side has danuta polyps obliterating meati and superior 2/3rd nasal cavity, but patent nasal airway  -     The left side has danuta polyps obliterating meati and superior 2/3rd nasal cavity, but patent nasal airway  -     Nasal secretions: purulent secretions from bilateral maxillary, frontal, and right sphenoid sinuses     -     Nasal septum: RIGHT moderate deviation   -     Inferior turbinate: hypertrophy or edema (Moderate) and Normal mucosa without significant hypertrophy bilaterally  -     Middle turbinate:  medialized secondary to middle meati polyps  bilaterally      OPERATIVE DETAILS:    Anesthesia and Prep -- After being properly identified in the preoperative holding area, the patient was brought into the operating suite. The patient was placed supine on the operating table. A pre-procedural time-out was performed. Pre-operative antibiotics and steroids were administered. After induction of general anesthesia, the patient was successfully intubated with confirmation of tube placement via CO2 return. The bed was then turned 180 degrees. Eyes were taped closed with steristrips. Pledgets soaked in 1:1000 fluorescein dyed epinephrine were placed in each nostril. The face was draped sterilely.       Image Guidance Registration and Prep -- The registration sticker for the [employer] image guidance system was placed on the forehead. The face was registered with good correlation. Landmarks were checked with the probe which showed satisfactory accuracy.       Septoplasty -- The septum was injected bilaterally with 1% lidocaine with 1:100,000 epinephrine. A 15 blade was utilized to make a fransisco-transfixion incision in the left nasal cavity. A John elevator was then used to elevate a sub-mucoperichondrial flap. Once adequate room was available, the 0 degree endoscopic was placed into the flap and the septal cartilage was visualized. A suction freer was utilized to raise the flap back to the bony-cartilaginous junction. A caudal transcartilaginous cut was made using the Coffey elevator taking care to leave at least a 1cm caudal strut. The opposing mucoperichondrial flap was then elevated off of the cartilage and bone of the right side. Then, a Chelsey forceps was used to cut away the cartilage working anterior to posterior. As the cartilage was removed superiorly, care was taken to leave at least a 1cm dorsal strut. The nose was then inspected and the septum was seen to be midline. The working room in the nose was much improved. The hemitransfixion incision was closed with interrupted 5-0 fast absorbing  plain gut suture.    Bilateral nasal endoscopy with polypectomy. The pledgets were removed and a 0 degree endoscope was utilized to examine both nasal cavities. 1% lidocaine with 1:100,000 epinephrine was injected into the large polyps filling the bilatearl nasal cavity. A biopsy was taken of a large polyp in each nasal cavity with a Jassikesley. The remainder of the polyps were removed working from inferior to superior with the microdebrider until the nasal cavity was patent and the middle meatus could be visualized bilaterally.      Left nasal endoscopy with maxillary antrostomy with removal of tissue -- 1% Lidocaine with 1:100,000 epinephrine was used to infiltrate the axilla of the middle turbinate, head of the middle turbinate and the sphenopalatine artery. The middle turbinate was medialized using a freer. The maxillary seeker was inserted and slipped behind the uncinate process. This was pulled anteriorly exposing the edge. A pediatric back-biter was inserted and the uncinate was cut anteriorly toward but not into the nasolacrimal duct. The microdebrider was used to remove the uncinate process above and below the cut made by the back-biter. A curved suction was inserted into the maxillary sinus through the antrostomy and pushed posteriorly and inferiorly to enlarge the opening. The microdebrider was used to clean up the edges of bone and mucosa. A 30 degree endoscope was then attached and used to view the sinus. The natural os was identified and the maxillary antrostomy was complete. There was polyps and severe polypoid thickening. This tissue was removed with antral grasping forceps.       Left nasal endoscopy with combination sphenoidotomy with tissue removal and total ethmoidectomy  - The 0 degree endoscope was reattached and the ethmoid bulla was identified. The microdebrider was used to open the bulla working from medial and inferior to lateral and superior. Removal extended laterally near the lamina  papyracea and superiorly toward skull base. Next, the basal lamella was opened with the microdebrider. Posterior ethmoid cells were opened in succession using image guidance intermittently to verify location. The microdebrider was used to remove thin bone and clean up mucosal edges. All ethmoids air cells were opened.     Using the microdebrider, the dissection was carried posteriorly through the vertical lamella and the superior turbinate was identified medially. The inferior one-third of the superior turbinate was removed with the microdebrider. Proceeding between the superior turbinate and the septum, the sphenoid ostium was easily identified. A J-currette was inserted and the shoulder of the instrument was pushed inferiorly and medially widening the ostium. A mushroom punch was inserted and used to widen the ostium. There was polypoid thickening of the mucosa which was removed with blakesley forceps.      Left frontal sinusostomy and frontal sinus exploration - The 45 degree scope was used to identify landmarks of the frontal recess. The CT scan was consulted to identify the drainage pattern. The agger nasi was removed, exposing the natural ostium. The sinus was entered through the natural ostium with the frontal sinus navigation probe. Surrounding bone and mucosa was removed. The frontal recess was opened in an anterior-posterior dimension from the frontal beak to the skull base and in a medial-lateral dimension from the middle turbinate to the lamina papyracea.  Minimal irrigation was used as there was evidence of bony dehiscence of the left frontal and superior orbital rim    Right nasal endoscopy with maxillary antrostomy with removal of tissue -- 1% Lidocaine with 1:100,000 epinephrine was used to infiltrate the axilla of the middle turbinate, head of the middle turbinate and the sphenopalatine artery. The middle turbinate was medialized using a freer. The maxillary seeker was inserted and slipped behind  the uncinate process. This was pulled anteriorly exposing the edge. A pediatric back-biter was inserted and the uncinate was cut anteriorly toward but not into the nasolacrimal duct.  The microdebrider was used to remove the uncinate process above and below the cut made by the back-biter. A curved suction was inserted into the maxillary sinus through the antrostomy and pushed posteriorly and inferiorly to enlarge the opening. The microdebrider was used to clean up the edges of bone and mucosa. A 30 degree endoscope was then attached and used to view the sinus. The natural os was identified and the maxillary antrostomy was complete. There was  polyps and severe polypoid thickening. This tissue was removed with antral grasping forceps.    Right nasal endoscopy with combination sphenoidotomy with tissue removal and total ethmoidectomy  - The 0 degree endoscope was reattached and the ethmoid bulla was identified. The microdebrider was used to open the bulla working from medial and inferior to lateral and superior. Removal extended laterally near the lamina papyracea and superiorly toward skull base. Next, the basal lamella was opened with the microdebrider. Posterior ethmoid cells were opened in succession using image guidance intermittently to verify location. The microdebrider was used to remove thin bone and clean up mucosal edges. All ethmoids air cells were opened. There was polypoid thickening of the mucosa which was removed with blakesley forceps.      Using the microdebrider, the dissection was carried posteriorly through the vertical lamella and the superior turbinate was identified medially. The inferior one-third of the superior turbinate was removed with the microdebrider. Proceeding between the superior turbinate and the septum, the sphenoid ostium was easily identified. A J-currette was inserted and the shoulder of the instrument was pushed inferiorly and medially widening the ostium. A mushroom punch was  inserted and used to widen the ostium.  There was polypoid thickening of the mucosa which was removed with blakesley forceps.      Right frontal sinusostomy and frontal sinus exploration - The 45 degree scope was used to identify landmarks of the frontal recess. The CT scan was consulted to identify the drainage pattern. The agger nasi was removed, exposing the natural ostium. The sinus was entered through the natural ostium with the frontal sinus navigation probe. Surrounding bone and mucosa was removed. The frontal recess was opened in an anterior-posterior dimension from the frontal beak to the skull base and in a medial-lateral dimension from the middle turbinate to the lamina papyracea.     Bilateral inferior turbinate submucosal resection -- The heads of the inferior turbinates were injected with 1% lidocaine with 1:100,000 epinephrine. A stab incision was made into the heads of the turbinates with a 15 blade. A opal was used to elevate a submocosal pocket. The microdebrider turbinate blade was used to remove the submucosal tissue of both inferior turbinates. A butter knife was used to outfracture both inferior turbinates giving a wide open nasal passage.      Conclusion -- Both sides were inspected and no orbital fat or evidence of cerebral spinal fluid leak were observed. Both sides were irrigated and clot removed.      Small PosiSepX packs (0.5 x 1.5cm) were inserted lateral to the middle turbinates bilaterally.      A flexible suction catheter was used to remove fluid and blood from the oropharynx and hypopharynx.     Steri strips were removed from the eyelids and the eyes were checked for tension or proptosis, none was observed. The image guidance sticker was removed. The patient's skin was cleaned. The patient was returned to the care of the anesthesia team. They were then weaned from the anesthetic and transported to the PACU in stable condition.

## 2024-04-17 NOTE — BRIEF OP NOTE
[facility]  Brief Operative Note     SUMMARY     Surgery Date: 4/17/2024     Surgeons and Role:     * Thomas Roman MD - Primary    Assisting Surgeon: None    Pre-op Diagnosis:  Chronic pansinusitis [J32.4]  Nasal polyposis [J33.9]  Hypertrophy of both inferior nasal turbinates [J34.3]  Nasal septal deviation [J34.2]    Post-op Diagnosis:  Post-Op Diagnosis Codes:     * Chronic pansinusitis [J32.4]     * Nasal polyposis [J33.9]     * Hypertrophy of both inferior nasal turbinates [J34.3]     * Nasal septal deviation [J34.2]    Procedure(s) (LRB):  FESS, WITH NASAL SEPTOPLASTY AND TURBINATE REDUCTION (Bilateral)    Anesthesia: General    Findings/Key Components:  see op note    Estimated Blood Loss: 100 mL         Specimens:   Specimen (24h ago, onward)       Start     Ordered    04/17/24 1401  Specimen to Pathology, Surgery ENT  Once        Comments: Pre-op Diagnosis: Chronic pansinusitis [J32.4]Nasal polyposis [J33.9]Hypertrophy of both inferior nasal turbinates [J34.3]Nasal septal deviation [J34.2]Procedure(s):FESS, WITH NASAL SEPTOPLASTY AND TURBINATE REDUCTION Number of specimens: 3Name of specimens: 1. Right nasal polyp -perm2. Right maxillary sinus -perm3. Left nasal polyp -perm     References:    Click here for ordering Quick Tip   Question Answer Comment   Procedure Type: ENT    Which provider would you like to cc? THOMAS ROMAN    Release to patient Immediate        04/17/24 1418                    Discharge Note    SUMMARY     Admit Date: 4/17/2024    Discharge Date and Time: No discharge date for patient encounter.    Attending Physician: Thomas Roman MD     Discharge Provider: Thomas Roman    Final Diagnosis: Post-Op Diagnosis Codes:     * Chronic pansinusitis [J32.4]     * Nasal polyposis [J33.9]     * Hypertrophy of both inferior nasal turbinates [J34.3]     * Nasal septal deviation [J34.2]    Disposition: Home or Self Care, discharged in good condition    Follow Up/Patient  Instructions:       Medications:  Reconciled Home Medications:   Current Discharge Medication List        START taking these medications    Details   HYDROcodone-acetaminophen (NORCO) 5-325 mg per tablet Take 1 tablet by mouth every 6 (six) hours as needed.  Qty: 20 tablet, Refills: 0    Comments: Quantity prescribed more than 7 day supply? No           CONTINUE these medications which have NOT CHANGED    Details   metFORMIN (GLUCOPHAGE) 500 MG tablet Take 1 tablet (500 mg total) by mouth 2 (two) times daily with meals.  Qty: 60 tablet, Refills: 11      predniSONE (DELTASONE) 10 MG tablet Take 3 tablets a day for 4 days (1 before breakfast, 1 after lunch, 1 before bed). Then take 2 tablets a day for 4 days (1 before breakfast, 1 before bed). Then take 1 tablet a day for 4 days (1 before breakfast). START 7 DAYS PRIOR TO SURGERY  Qty: 24 tablet, Refills: 0           Discharge Procedure Orders   Ambulatory referral/consult to Pre-Admit   Standing Status: Future   Referral Priority: Routine Referral Type: Consultation   Referral Reason: Specialty Services Required   Requested Specialty: Internal Medicine   Number of Visits Requested: 1

## 2024-04-18 VITALS
OXYGEN SATURATION: 97 % | HEART RATE: 92 BPM | WEIGHT: 298.5 LBS | TEMPERATURE: 97 F | BODY MASS INDEX: 44.21 KG/M2 | RESPIRATION RATE: 18 BRPM | SYSTOLIC BLOOD PRESSURE: 159 MMHG | HEIGHT: 69 IN | DIASTOLIC BLOOD PRESSURE: 96 MMHG

## 2024-04-18 NOTE — CARE UPDATE
Dr. Roman was called and update on patient clinical condition. Mild bleeding observed, nasal/moustache dressing intact. Complaint of headache, pain meds given. Remain in high fowlers position. No respiratory distress observed. Close observation continues.

## 2024-04-19 NOTE — H&P
Day of Surgery History & Physical Exam    Patient Name: Manolo Osman     Date: 4/19/2024          HPI: Manolo is a 43 y.o. male who presents today for operative treatment of   1. Pre-op exam    2. Nasal polyposis    3. Chronic pansinusitis    4. Hypertrophy of both inferior nasal turbinates    5. Nasal septal deviation    6. Nasal obstruction    . No recent illnesses.          ROS:    A complete review of systems was obtained and is otherwise negative.       PMH:      Past Medical History:   Diagnosis Date    GERD (gastroesophageal reflux disease)     Morbid obesity with BMI of 40.0-44.9, adult     Prediabetes     Sleep apnea           PSH:      Past Surgical History:   Procedure Laterality Date    ENDOSCOPY, FRONTAL SINUS, WITH TISSUE EXCISION Bilateral 4/17/2024    Procedure: ENDOSCOPY, FRONTAL SINUS, WITH TISSUE EXCISION;  Surgeon: Thomas Roman MD;  Location: [facility] OR;  Service: ENT;  Laterality: Bilateral;    ENDOSCOPY, NOSE OR PARANASAL SINUS, WITH MAXILLARY ANTROSTOMY Bilateral 4/17/2024    Procedure: ENDOSCOPY, NOSE OR PARANASAL SINUS, WITH MAXILLARY ANTROSTOMY;  Surgeon: Thomas Roman MD;  Location: [facility] OR;  Service: ENT;  Laterality: Bilateral;    ETHMOIDECTOMY, TOTAL, ENDOSCOPIC Bilateral 4/17/2024    Procedure: ETHMOIDECTOMY, TOTAL, ENDOSCOPIC;  Surgeon: Thomas Roman MD;  Location: [facility] OR;  Service: ENT;  Laterality: Bilateral;    FESS, WITH NASAL SEPTOPLASTY AND TURBINATE REDUCTION Bilateral 4/17/2024    Procedure: FESS, WITH NASAL SEPTOPLASTY AND TURBINATE REDUCTION;  Surgeon: Thomas Roman MD;  Location: [facility] OR;  Service: ENT;  Laterality: Bilateral;    FESS, WITH SPHENOETHMOIDECTOMY Bilateral 4/17/2024    Procedure: FESS, WITH SPHENOETHMOIDECTOMY;  Surgeon: Thomas Roman MD;  Location: [facility] OR;  Service: ENT;  Laterality: Bilateral;    POLYPECTOMY Bilateral 4/17/2024    Procedure: POLYPECTOMY;  Surgeon: Thomas Roman MD;  Location: [facility] OR;  Service: ENT;  Laterality:  "Bilateral;          MEDS:        Current Facility-Administered Medications:     LIDOcaine (PF) 10 mg/ml (1%) injection 10 mg, 1 mL, Intradermal, Once, Bear Rios MD    Current Outpatient Medications:     metFORMIN (GLUCOPHAGE) 500 MG tablet, Take 1 tablet (500 mg total) by mouth 2 (two) times daily with meals., Disp: 60 tablet, Rfl: 11    HYDROcodone-acetaminophen (NORCO) 5-325 mg per tablet, Take 1 tablet by mouth every 6 (six) hours as needed., Disp: 20 tablet, Rfl: 0    predniSONE (DELTASONE) 10 MG tablet, Take 3 tablets a day for 4 days (1 before breakfast, 1 after lunch, 1 before bed). Then take 2 tablets a day for 4 days (1 before breakfast, 1 before bed). Then take 1 tablet a day for 4 days (1 before breakfast). START 7 DAYS PRIOR TO SURGERY (Patient not taking: Reported on 4/10/2024), Disp: 24 tablet, Rfl: 0       ALLERGIES:     Patient has no known allergies.       EXAM:     Vitals: BP (!) 159/96 (BP Location: Left arm, Patient Position: Lying)   Pulse 92   Temp 97.3 °F (36.3 °C) (Temporal)   Resp 18   Ht 5' 9" (1.753 m)   Wt 135.4 kg (298 lb 8.1 oz)   SpO2 97%   BMI 44.08 kg/m²      General: Awake, at baseline alertness.      HEENT: No scleral icterus or conjunctival hemorrhage. Globe position appears normal. External ears  normal. Nose patent without rhinorrhea. No lymphadenopathy. No thyromegaly     Cardiovascular: No cyanosis.     Pulmonary: No audible stridor. Breathing easily with no labor.     Neuro: Symmetric facial movement.      Psychiatry: Appropriate affect and mood.     Skin: No scars or lesions on face or neck.     Extremities: Moves all extremities with normal range of motion.      Other Findings: None.       Assessment & Plan: Manolo has diagnoses of   1. Pre-op exam    2. Nasal polyposis    3. Chronic pansinusitis    4. Hypertrophy of both inferior nasal turbinates    5. Nasal septal deviation    6. Nasal obstruction     and will go to the OR today for FESS. Informed " consent was obtained in clinic and available in the EMR today. All questions have been answered.

## 2024-04-20 LAB — BACTERIA SPEC AEROBE CULT: NORMAL

## 2024-04-22 ENCOUNTER — OFFICE VISIT (OUTPATIENT)
Dept: OTOLARYNGOLOGY | Facility: CLINIC | Age: 44
End: 2024-04-22
Payer: COMMERCIAL

## 2024-04-22 VITALS — BODY MASS INDEX: 43.69 KG/M2 | TEMPERATURE: 99 F | HEIGHT: 69 IN | WEIGHT: 295 LBS

## 2024-04-22 DIAGNOSIS — J34.2 NASAL SEPTAL DEVIATION: Primary | ICD-10-CM

## 2024-04-22 DIAGNOSIS — J34.3 HYPERTROPHY OF BOTH INFERIOR NASAL TURBINATES: ICD-10-CM

## 2024-04-22 DIAGNOSIS — J33.9 NASAL POLYPOSIS: ICD-10-CM

## 2024-04-22 DIAGNOSIS — J32.4 CHRONIC PANSINUSITIS: ICD-10-CM

## 2024-04-22 LAB
BACTERIA SPEC AEROBE CULT: NO GROWTH
BACTERIA SPEC ANAEROBE CULT: ABNORMAL
BACTERIA SPEC ANAEROBE CULT: NORMAL

## 2024-04-22 PROCEDURE — 99999 PR PBB SHADOW E&M-EST. PATIENT-LVL III: CPT | Mod: PBBFAC,,, | Performed by: STUDENT IN AN ORGANIZED HEALTH CARE EDUCATION/TRAINING PROGRAM

## 2024-04-22 PROCEDURE — 99024 POSTOP FOLLOW-UP VISIT: CPT | Mod: S$GLB,,, | Performed by: STUDENT IN AN ORGANIZED HEALTH CARE EDUCATION/TRAINING PROGRAM

## 2024-04-22 PROCEDURE — 31237 NSL/SINS NDSC SURG BX POLYPC: CPT | Mod: 79,50,S$GLB, | Performed by: STUDENT IN AN ORGANIZED HEALTH CARE EDUCATION/TRAINING PROGRAM

## 2024-04-22 PROCEDURE — 3044F HG A1C LEVEL LT 7.0%: CPT | Mod: CPTII,S$GLB,, | Performed by: STUDENT IN AN ORGANIZED HEALTH CARE EDUCATION/TRAINING PROGRAM

## 2024-04-22 PROCEDURE — 3008F BODY MASS INDEX DOCD: CPT | Mod: CPTII,S$GLB,, | Performed by: STUDENT IN AN ORGANIZED HEALTH CARE EDUCATION/TRAINING PROGRAM

## 2024-04-22 PROCEDURE — 1159F MED LIST DOCD IN RCRD: CPT | Mod: CPTII,S$GLB,, | Performed by: STUDENT IN AN ORGANIZED HEALTH CARE EDUCATION/TRAINING PROGRAM

## 2024-04-22 NOTE — PROGRESS NOTES
Chief complaint:    Chief Complaint   Patient presents with    Follow-up     Post op fess/ septo/ turbs         Referring Provider:  No referring provider defined for this encounter.      History of present illness, 5/6/22:     Mr. Osman is a 43 y.o. presenting for evaluation of sinus issues.     Frequent anterior rhinorrhea (thick, green, usually left), left retrorbital and forehead pain, left nasal obstruction.     Had a similar episode about 2 months ago. Treated with abx, then got mostly better, but never totally cleared. Now symptoms more severe again over last couple weeks.     No inciting incident.      Denies history of allergies. No treatment.      Denies frequent prior sinus infections.       Prior sinus surgery: no.     Asthma history: No     NSAID/ASA allergy: No     Current smoker:  No    Return clinic visit, 8/22/22    Main symptom now is green rhinorrhea, more on right. Thicker in the morning.  Every day for last several months. All day.     A few days of right retro-orbital pain, but not frequent.     Denies nasal obstruction or anosmia.     Tried a nasal spray, but not routinely.     All started shortly after wisdom tooth extraction in May. Has had three rounds of antibiotics. Did not resolved on shorter course of abx.     Denies history of allergies.      Return clinic visit, 1/23/23    COVID 2 weeks ago. URI symptoms resolved. Since then purulent rhinorrhea, nasal obstruction, hyposmia. Mucinex, tylenol.    Return clinic visit, 2/19/24  Current symptoms in nasal obstruction (bilateral), purulent rhinorrhea, facial pressure, anosmia.   Symptoms present for 2 years now, progressively worsening. Minimal improvement on two rounds of 21 day courses of antibiotics and long course of steroids.   He hsa used saline rinses and flonase for several months without improvement.     Return clinic visit, 4/22/24  He has done well since surgery. He denies issues breathing.   Mild nose bleeding. Feels some  congestion and pressure. Worse the first couple days, but now improving.     History      Past Medical History:   Past Medical History:   Diagnosis Date    GERD (gastroesophageal reflux disease)     Morbid obesity with BMI of 40.0-44.9, adult     Prediabetes     Sleep apnea          Past Surgical History:  Past Surgical History:   Procedure Laterality Date    ENDOSCOPY, FRONTAL SINUS, WITH TISSUE EXCISION Bilateral 4/17/2024    Procedure: ENDOSCOPY, FRONTAL SINUS, WITH TISSUE EXCISION;  Surgeon: Thomas Roman MD;  Location: [facility] OR;  Service: ENT;  Laterality: Bilateral;    ENDOSCOPY, NOSE OR PARANASAL SINUS, WITH MAXILLARY ANTROSTOMY Bilateral 4/17/2024    Procedure: ENDOSCOPY, NOSE OR PARANASAL SINUS, WITH MAXILLARY ANTROSTOMY;  Surgeon: Thomas Roman MD;  Location: [facility] OR;  Service: ENT;  Laterality: Bilateral;    ETHMOIDECTOMY, TOTAL, ENDOSCOPIC Bilateral 4/17/2024    Procedure: ETHMOIDECTOMY, TOTAL, ENDOSCOPIC;  Surgeon: Thomas Roman MD;  Location: [facility] OR;  Service: ENT;  Laterality: Bilateral;    FESS, WITH NASAL SEPTOPLASTY AND TURBINATE REDUCTION Bilateral 4/17/2024    Procedure: FESS, WITH NASAL SEPTOPLASTY AND TURBINATE REDUCTION;  Surgeon: Thomas Roman MD;  Location: [facility] OR;  Service: ENT;  Laterality: Bilateral;    FESS, WITH SPHENOETHMOIDECTOMY Bilateral 4/17/2024    Procedure: FESS, WITH SPHENOETHMOIDECTOMY;  Surgeon: Thomas Roman MD;  Location: [facility] OR;  Service: ENT;  Laterality: Bilateral;    POLYPECTOMY Bilateral 4/17/2024    Procedure: POLYPECTOMY;  Surgeon: Thomas Roman MD;  Location: [facility] OR;  Service: ENT;  Laterality: Bilateral;         Medications: Medication list reviewed. He  has a current medication list which includes the following prescription(s): hydrocodone-acetaminophen, metformin, and prednisone, and the following Facility-Administered Medications: lidocaine (pf) 10 mg/ml (1%).     Allergies: Review of patient's allergies indicates:  No Known Allergies  "     Family history: family history includes No Known Problems in his brother, father, maternal grandfather, maternal grandmother, paternal grandfather, paternal grandmother, sister, sister, and sister; Suicide in his mother.         Social History          Alcohol use:  reports current alcohol use.            Tobacco:  reports that he has been smoking cigarettes. He has never used smokeless tobacco.         Physical Examination      Vitals: Temperature 98.8 °F (37.1 °C), temperature source Temporal, height 5' 9" (1.753 m), weight 133.8 kg (294 lb 15.6 oz).      General: Well developed, well nourished, well hydrated.     Voice: no dysphonia, no dysarthria      Head/Face: Normocephalic, atraumatic. No scars or lesions. Facial musculature equal.     Eyes: No scleral icterus or conjunctival hemorrhage. EOMI. PERRLA.     Ears:     Right ear: No gross deformity. EAC is clear of debris and erythema. TM are intact with a pneumatized middle ear. No signs of retraction, fluid or infection.      Left ear: No gross deformity. EAC is clear of debris and erythema. TM are intact with a pneumatized middle ear. No signs of retraction, fluid or infection.      Nose: No gross deformity or lesions. Severely congested. Severe right septal deviation and inferior turbinate hypertrophy . See procedure note.    Mouth/Oropharynx: Lips without any lesions. No mucosal lesions within the oropharynx. No tonsillar exudate or lesions. Pharyngeal walls symmetrical. Uvula midline. Tongue midline without lesions.     Neurologic: Moving all extremities without gross abnormality.CN II-XII grossly intact. House-Brackmann 1/6. No signs of nystagmus.          Data reviewed      Review of records:      I reviewed records from the referring provider's office visits describing the history, workup, and/or treatment of this problem thus far.     Op note reviewed      Cultures  No growth to date      Images    I have independently reviewed the following " imaging:    CT sinus 2/19/24  V        Complete obstruction with bony remodeling an osteotic changes in the bilateral maxillary, anterior and posterior ethmoids, bilateral frontal, and right sphenoid sinuses. Partial left sphenoid opacification.   Bony dehiscence of left frontal sinus, superior orbital rim  Right septal deviation and spur  Inferior turbinate hypertrophy   Nasal polyposis in middle meatus    Procedures:    NASAL ENDOSCOPY WITH POLYPECTOMY &/OR DEBRIDEMENT  (cpt 51899)    Procedure: Risks, benefits, and alternatives of the procedure were discussed with the patient, and the patient consented to the nasal endoscopy with debridement.  Anterior rhinoscopy was insufficient to explain patients symptoms.      The nasal cavity was sprayed with a topical decongestant and anesthetic . The endoscope was passed into each nostril and each nasal cavity was visualized.  On each side the nasal cavity, sinuses (if open), turbinates, and septum were examined with the findings described below. The turbinates, middle meatus, superior meatus and sphenoethmoidal recess was examined.  Crusting and polypoid material was removed with suction and straight non thru cut forceps.   At the end of the examination, the scope was removed. The patient tolerated the procedure well with no complications.     Endoscopic Sinonasal Exam Findings:  -     Sinuses examined: bilateral maxillary, bilateral ethmoid anterior and posterior, bilateral sphenoid, and bilateral frontal            The right sinus(es) have  expected post healing            The left sinus(es) have  expected post op healing  -     Nasal secretions: dried crusts, dried blood, and packing which was removed as noted above  bilaterally  -     Nasal septum: no significant deviation and no perforation appreciated   -     Inferior turbinate: - normal mucosa without significant hypertrophy - bilaterally  -     Middle turbinate: - normal mucosa without significant hypertrophy -  bilaterally  -     Other findings: - no mass or obstructive lesion -       Assessment/Plan:    1. Nasal septal deviation    2. Hypertrophy of both inferior nasal turbinates    3. Nasal polyposis    4. Chronic pansinusitis      S/p  bilateral full FESS, polypectomy, septoplasty and inferior turbinate reduction.     Doing well    Start saline irrigations BID   F/u 2-3 weeks; will hold on starting budesonide rinses until that time  Return to clinic sooner with concerns        Thomas Roman MD  [facility]   [facility]  [id number] [place] [address].  [place] [id number]  P: [phone]  F: [phone]

## 2024-04-26 LAB
FINAL PATHOLOGIC DIAGNOSIS: NORMAL
GROSS: NORMAL
Lab: NORMAL

## 2024-04-29 ENCOUNTER — PATIENT MESSAGE (OUTPATIENT)
Dept: OTOLARYNGOLOGY | Facility: CLINIC | Age: 44
End: 2024-04-29
Payer: COMMERCIAL

## 2024-07-30 RX ORDER — METFORMIN HYDROCHLORIDE 500 MG/1
500 TABLET ORAL 2 TIMES DAILY WITH MEALS
Qty: 180 TABLET | Refills: 0 | Status: SHIPPED | OUTPATIENT
Start: 2024-07-30

## 2024-07-30 NOTE — TELEPHONE ENCOUNTER
Care Due:                  Date            Visit Type   Department     Provider  --------------------------------------------------------------------------------                                EP -                              PRIMARY      [facility] INTERNAL  Last Visit: 01-      CARE ([facility])   MEDICINE       Rigoberto Dhillon  Next Visit: None Scheduled  None         None Found                                                            Last  Test          Frequency    Reason                     Performed    Due Date  --------------------------------------------------------------------------------    HBA1C.......  6 months...  metFORMIN................  01- 07-    [facility] Embedded Care Due Messages. Reference number: [id number].   7/30/2024 12:27:46 PM CDT

## 2024-08-18 NOTE — TELEPHONE ENCOUNTER
No care due was identified.  [facility] Embedded Care Due Messages. Reference number: [id number].   8/18/2024 7:50:14 AM CDT

## 2024-08-19 RX ORDER — METFORMIN HYDROCHLORIDE 500 MG/1
500 TABLET ORAL 2 TIMES DAILY WITH MEALS
Qty: 180 TABLET | Refills: 0 | Status: SHIPPED | OUTPATIENT
Start: 2024-08-19

## 2024-09-08 NOTE — TELEPHONE ENCOUNTER
No care due was identified.  [facility] Embedded Care Due Messages. Reference number: [id number].   9/07/2024 10:06:54 PM CDT

## 2024-09-09 RX ORDER — METFORMIN HYDROCHLORIDE 500 MG/1
500 TABLET ORAL 2 TIMES DAILY WITH MEALS
Qty: 180 TABLET | Refills: 0 | Status: SHIPPED | OUTPATIENT
Start: 2024-09-09

## 2024-12-07 DIAGNOSIS — R73.03 PREDIABETES: Primary | ICD-10-CM

## 2024-12-07 NOTE — TELEPHONE ENCOUNTER
Care Due:                  Date            Visit Type   Department     Provider  --------------------------------------------------------------------------------                                EP -                              PRIMARY      [facility] INTERNAL  Last Visit: 01-      CARE ([facility])   MEDICINE       Rigoberto Dhillon  Next Visit: None Scheduled  None         None Found                                                            Last  Test          Frequency    Reason                     Performed    Due Date  --------------------------------------------------------------------------------    Cr..........  12 months..  metFORMIN................  01- 01-    HBA1C.......  6 months...  metFORMIN................  01- 07-    [facility] Embedded Care Due Messages. Reference number: [id number].   12/07/2024 7:52:14 AM CST

## 2024-12-08 NOTE — TELEPHONE ENCOUNTER
Refill Routing Note   Medication(s) are not appropriate for processing by [facility] for the following reason(s):        Required labs outdated    [facility] action(s):  Defer     Requires labs : Yes             Appointments  past 12m or future 3m with PCP    Date Provider   Last Visit   1/29/2024 Rigoberto Dhillon MD   Next Visit   Visit date not found Rigoberto Dhillon MD   ED visits in past 90 days: 0        Note composed:2:05 PM 12/08/2024

## 2024-12-09 RX ORDER — METFORMIN HYDROCHLORIDE 500 MG/1
500 TABLET ORAL 2 TIMES DAILY WITH MEALS
Qty: 60 TABLET | Refills: 0 | Status: SHIPPED | OUTPATIENT
Start: 2024-12-09

## 2025-01-13 RX ORDER — METFORMIN HYDROCHLORIDE 500 MG/1
500 TABLET ORAL 2 TIMES DAILY WITH MEALS
Qty: 180 TABLET | Refills: 0 | OUTPATIENT
Start: 2025-01-13

## 2025-01-13 NOTE — TELEPHONE ENCOUNTER
No care due was identified.  [facility] Embedded Care Due Messages. Reference number: [id number].   1/13/2025 12:21:35 PM CST

## 2025-01-13 NOTE — TELEPHONE ENCOUNTER
Refill Routing Note   Medication(s) are not appropriate for processing by [facility] for the following reason(s):        Required labs outdated    [facility] action(s):  Defer               Appointments  past 12m or future 3m with PCP    Date Provider   Last Visit   1/29/2024 Rigoberto Dhillon MD   Next Visit   Visit date not found Rigoberto Dhillon MD   ED visits in past 90 days: 0        Note composed:4:02 PM 01/13/2025

## 2025-01-15 NOTE — TELEPHONE ENCOUNTER
Provider Staff:  Please note Refusal of medication.     Action required for this patient.      Requested Prescriptions     Refused Prescriptions Disp Refills    metFORMIN (GLUCOPHAGE) 500 MG tablet [Pharmacy Med Name: METFORMIN  MG TABLET] 180 tablet 0     Sig: TAKE 1 TABLET BY MOUTH TWICE A DAY WITH FOOD     Refused By: TRAVIS PAGE     Reason for Refusal: Patient needs an appointment      Thanks!  [facility]   Note composed: 01/15/2025 11:28 AM

## 2025-01-16 NOTE — TELEPHONE ENCOUNTER
Contacted patient to create appt for medication refills. Patient states that he has moved to [place] but will come in to clinic for refills. Patient notified with verbal understanding.

## 2025-01-23 ENCOUNTER — TELEPHONE (OUTPATIENT)
Dept: INTERNAL MEDICINE | Facility: CLINIC | Age: 45
End: 2025-01-23
Payer: COMMERCIAL

## 2025-01-23 NOTE — TELEPHONE ENCOUNTER
I spoke to Mr. Osman and he stated that he could not do Saturday clinic due to him flying out that day. He said that he will call back to reschedule his appointment.

## 2025-02-18 ENCOUNTER — OFFICE VISIT (OUTPATIENT)
Dept: INTERNAL MEDICINE | Facility: CLINIC | Age: 45
End: 2025-02-18
Payer: COMMERCIAL

## 2025-02-18 VITALS
RESPIRATION RATE: 18 BRPM | WEIGHT: 308 LBS | HEART RATE: 105 BPM | TEMPERATURE: 99 F | BODY MASS INDEX: 44.09 KG/M2 | DIASTOLIC BLOOD PRESSURE: 96 MMHG | OXYGEN SATURATION: 96 % | SYSTOLIC BLOOD PRESSURE: 150 MMHG | HEIGHT: 70 IN

## 2025-02-18 DIAGNOSIS — Z00.00 ANNUAL PHYSICAL EXAM: Primary | ICD-10-CM

## 2025-02-18 DIAGNOSIS — E66.01 MORBID OBESITY WITH BMI OF 40.0-44.9, ADULT: ICD-10-CM

## 2025-02-18 DIAGNOSIS — M77.8 SHOULDER TENDONITIS, RIGHT: ICD-10-CM

## 2025-02-18 DIAGNOSIS — I10 PRIMARY HYPERTENSION: ICD-10-CM

## 2025-02-18 DIAGNOSIS — R73.03 PREDIABETES: ICD-10-CM

## 2025-02-18 DIAGNOSIS — K21.9 GASTROESOPHAGEAL REFLUX DISEASE WITHOUT ESOPHAGITIS: ICD-10-CM

## 2025-02-18 DIAGNOSIS — J34.89 NASAL OBSTRUCTION: ICD-10-CM

## 2025-02-18 DIAGNOSIS — J32.4 CHRONIC PANSINUSITIS: ICD-10-CM

## 2025-02-18 DIAGNOSIS — G47.33 OSA (OBSTRUCTIVE SLEEP APNEA): ICD-10-CM

## 2025-02-18 DIAGNOSIS — N52.9 ERECTILE DYSFUNCTION, UNSPECIFIED ERECTILE DYSFUNCTION TYPE: ICD-10-CM

## 2025-02-18 PROCEDURE — 3077F SYST BP >= 140 MM HG: CPT | Mod: CPTII,S$GLB,,

## 2025-02-18 PROCEDURE — 1160F RVW MEDS BY RX/DR IN RCRD: CPT | Mod: CPTII,S$GLB,,

## 2025-02-18 PROCEDURE — 3008F BODY MASS INDEX DOCD: CPT | Mod: CPTII,S$GLB,,

## 2025-02-18 PROCEDURE — 1159F MED LIST DOCD IN RCRD: CPT | Mod: CPTII,S$GLB,,

## 2025-02-18 PROCEDURE — 3080F DIAST BP >= 90 MM HG: CPT | Mod: CPTII,S$GLB,,

## 2025-02-18 PROCEDURE — 4010F ACE/ARB THERAPY RXD/TAKEN: CPT | Mod: CPTII,S$GLB,,

## 2025-02-18 RX ORDER — SILDENAFIL 25 MG/1
25 TABLET, FILM COATED ORAL DAILY PRN
Qty: 30 TABLET | Refills: 0 | Status: SHIPPED | OUTPATIENT
Start: 2025-02-18 | End: 2026-02-18

## 2025-02-18 RX ORDER — VALSARTAN 160 MG/1
160 TABLET ORAL DAILY
Qty: 90 TABLET | Refills: 3 | Status: SHIPPED | OUTPATIENT
Start: 2025-02-18 | End: 2026-02-18

## 2025-02-18 NOTE — PROGRESS NOTES
Manolo Osman  1980        Subjective     Chief Complaint: Shoulder Pain and Annual Exam      History of Present Illness:  Mr. Manolo Osman is a 44 y.o. male who presents to clinic for establishing care          History of Present Illness    CHIEF COMPLAINT:  Mr. Osman presents today with right shoulder pain.    MUSCULOSKELETAL:  He reports right shoulder pain for 3 months. The pain is described as extreme and occurs with certain movements. He denies weakness, dropping objects, or sensation loss. The pain is localized to the shoulder area without radiation down the arm.    SLEEP APNEA:  He reports others have noticed breathing cessation during sleep. He experiences daytime fatigue.    GERD:  He reports severe GERD symptoms. Certain foods trigger his reflux, particularly red beans which cause intense symptoms that disrupt sleep and nearly induce vomiting.    SEXUAL HEALTH:  He reports decreased stamina during sexual intercourse and difficulty maintaining erections since last year. He experiences premature ejaculation and notes that erections spontaneously subside before reaching orgasm, though he continues to have occasional erections.    MENTAL HEALTH:  He reports mild depression following his divorce in September. He denies thoughts of self-harm or harm to others.  Has interest in hobbies.  Patient stressed with work.    WEIGHT MANAGEMENT:  He reports significant weight gain from 220 to approximately 300 lbs since starting his current job, attributing this to long office hours, reduced time for cooking, and poor dietary habits.    MEDICATIONS:  He takes metformin inconsistently, typically two tablets every other day, citing GI side effects as a factor in non-compliance.    SOCIAL HISTORY:  He moved from [place] to current location in January following divorce in September.      ROS:  Constitutional: +weight gain, +fatigue  Gastrointestinal: -vomiting  Male Genitourinary: +erectile  dysfunction  Musculoskeletal: +joint pain, -muscle weakness          PAST HISTORY:     Past Medical History:   Diagnosis Date    GERD (gastroesophageal reflux disease)     Morbid obesity with BMI of 40.0-44.9, adult     Prediabetes     Primary hypertension 2/18/2025    Sleep apnea        Past Surgical History:   Procedure Laterality Date    ENDOSCOPY, FRONTAL SINUS, WITH TISSUE EXCISION Bilateral 4/17/2024    Procedure: ENDOSCOPY, FRONTAL SINUS, WITH TISSUE EXCISION;  Surgeon: Thomas Roman MD;  Location: [facility] OR;  Service: ENT;  Laterality: Bilateral;    ENDOSCOPY, NOSE OR PARANASAL SINUS, WITH MAXILLARY ANTROSTOMY Bilateral 4/17/2024    Procedure: ENDOSCOPY, NOSE OR PARANASAL SINUS, WITH MAXILLARY ANTROSTOMY;  Surgeon: Thomas Roman MD;  Location: [facility] OR;  Service: ENT;  Laterality: Bilateral;    ETHMOIDECTOMY, TOTAL, ENDOSCOPIC Bilateral 4/17/2024    Procedure: ETHMOIDECTOMY, TOTAL, ENDOSCOPIC;  Surgeon: Thomas Roman MD;  Location: [facility] OR;  Service: ENT;  Laterality: Bilateral;    FESS, WITH NASAL SEPTOPLASTY AND TURBINATE REDUCTION Bilateral 4/17/2024    Procedure: FESS, WITH NASAL SEPTOPLASTY AND TURBINATE REDUCTION;  Surgeon: Thomas Roman MD;  Location: [facility] OR;  Service: ENT;  Laterality: Bilateral;    FESS, WITH SPHENOETHMOIDECTOMY Bilateral 4/17/2024    Procedure: FESS, WITH SPHENOETHMOIDECTOMY;  Surgeon: Thomas Roman MD;  Location: [facility] OR;  Service: ENT;  Laterality: Bilateral;    POLYPECTOMY Bilateral 4/17/2024    Procedure: POLYPECTOMY;  Surgeon: Thomas Roman MD;  Location: [facility] OR;  Service: ENT;  Laterality: Bilateral;       Family History   Problem Relation Name Age of Onset    Suicide Mother      No Known Problems Father      No Known Problems Sister      No Known Problems Sister      No Known Problems Sister      No Known Problems Brother      No Known Problems Maternal Grandmother      No Known Problems Maternal Grandfather      No Known Problems Paternal Grandmother       No Known Problems Paternal Grandfather         Social History     Socioeconomic History    Marital status:    Tobacco Use    Smoking status: Some Days     Types: Cigarettes    Smokeless tobacco: Never    Tobacco comments:     Smokes 2 cigarettes a day.   Substance and Sexual Activity    Alcohol use: Yes     Comment: Occasional    Drug use: No    Sexual activity: Yes     Partners: Female     Birth control/protection: None     Social Drivers of Health     Financial Resource Strain: Low Risk  (2/18/2025)    Overall Financial Resource Strain (CARDIA)     Difficulty of Paying Living Expenses: Not hard at all   Food Insecurity: No Food Insecurity (2/18/2025)    Hunger Vital Sign     Worried About Running Out of Food in the Last Year: Never true     Ran Out of Food in the Last Year: Never true   Transportation Needs: Unknown (2/18/2025)    PRAPARE - Transportation     Lack of Transportation (Medical): No   Physical Activity: Unknown (2/18/2025)    Exercise Vital Sign     Days of Exercise per Week: 0 days   Stress: Stress Concern Present (2/18/2025)    [employer] [place] [employer] - Occupational Stress Questionnaire     Feeling of Stress : To some extent   Housing Stability: Low Risk  (2/18/2025)    Housing Stability Vital Sign     Unable to Pay for Housing in the Last Year: No     Homeless in the Last Year: No       MEDICATIONS & ALLERGIES:     Current Outpatient Medications on File Prior to Visit   Medication Sig    metFORMIN (GLUCOPHAGE) 500 MG tablet TAKE 1 TABLET BY MOUTH TWICE A DAY WITH FOOD    [DISCONTINUED] HYDROcodone-acetaminophen (NORCO) 5-325 mg per tablet Take 1 tablet by mouth every 6 (six) hours as needed.    [DISCONTINUED] predniSONE (DELTASONE) 10 MG tablet Take 3 tablets a day for 4 days (1 before breakfast, 1 after lunch, 1 before bed). Then take 2 tablets a day for 4 days (1 before breakfast, 1 before bed). Then take 1 tablet a day for 4 days (1 before breakfast). START 7 DAYS  "PRIOR TO SURGERY (Patient not taking: Reported on 2/18/2025)     Current Facility-Administered Medications on File Prior to Visit   Medication    LIDOcaine (PF) 10 mg/ml (1%) injection 10 mg       Review of patient's allergies indicates:  No Known Allergies    OBJECTIVE:     Vital Signs:  Vitals:    02/18/25 1546 02/18/25 1631   BP: (!) 152/94 (!) 150/96   BP Location: Right arm    Patient Position: Sitting    Pulse: 105    Resp: 18    Temp: 99.1 °F (37.3 °C)    TempSrc: Oral    SpO2: 96%    Weight: (!) 139.7 kg (307 lb 15.7 oz)    Height: 5' 10" (1.778 m)        Body mass index is 44.19 kg/m².     Physical Exam:  Physical Exam  Vitals and nursing note reviewed.   Constitutional:       General: He is not in acute distress.     Appearance: He is obese. He is not ill-appearing.   HENT:      Head: Normocephalic and atraumatic.      Mouth/Throat:      Mouth: Mucous membranes are moist.      Pharynx: Oropharynx is clear. No oropharyngeal exudate or posterior oropharyngeal erythema.   Eyes:      Extraocular Movements: Extraocular movements intact.      Conjunctiva/sclera: Conjunctivae normal.   Cardiovascular:      Rate and Rhythm: Normal rate and regular rhythm.   Pulmonary:      Effort: Pulmonary effort is normal. No respiratory distress.      Breath sounds: Normal breath sounds. No wheezing or rales.   Chest:      Chest wall: No tenderness.   Abdominal:      Palpations: Abdomen is soft.      Tenderness: There is no abdominal tenderness. There is no right CVA tenderness, left CVA tenderness or guarding.   Musculoskeletal:         General: No tenderness (midline). Normal range of motion.      Cervical back: Normal range of motion and neck supple. No tenderness.      Right lower leg: No edema.      Left lower leg: No edema.      Comments: Pain ellicited on internal rotation of right shoulder   Lymphadenopathy:      Cervical: No cervical adenopathy.   Skin:     General: Skin is warm and dry.   Neurological:      Mental " "Status: He is alert and oriented to person, place, and time.            Laboratory  Lab Results   Component Value Date    WBC 10.66 01/29/2024    HGB 14.5 01/29/2024    HCT 40 04/17/2024    MCV 91 01/29/2024     01/29/2024     Lab Results   Component Value Date     01/29/2024     01/29/2024    K 4.3 01/29/2024     01/29/2024    CO2 24 01/29/2024    BUN 10 01/29/2024    CREATININE 1.1 01/29/2024    CALCIUM 9.8 01/29/2024     No results found for: "INR", "PROTIME"  Lab Results   Component Value Date    HGBA1C 5.6 01/10/2024     No results for input(s): "POCTGLUCOSE" in the last 72 hours.      Health Maintenance         Date Due Completion Date    Hepatitis C Screening Never done ---    HIV Screening Never done ---    Pneumococcal Vaccines (Age 0-49) (1 of 2 - PCV) Never done ---    TETANUS VACCINE 08/19/2015 8/19/2005    COVID-19 Vaccine (2 - 2024-25 season) 09/01/2024 8/7/2021    Hemoglobin A1c (Prediabetes) 01/10/2025 1/10/2024    Lipid Panel 01/10/2025 1/10/2024    RSV Vaccine (Age 60+ and Pregnant patients) (1 - 1-dose 75+ series) 04/25/2055 ---            ASSESSMENT & PLAN:   44 y.o. male who was seen today in clinic for establishing care/annual    Annual physical exam  -     Hemoglobin A1C; Future; Expected date: 02/18/2025  -     CBC Without Differential; Future; Expected date: 02/18/2025  -     Comprehensive Metabolic Panel; Future; Expected date: 02/18/2025  -     TSH; Future; Expected date: 02/18/2025  -     Lipid Panel; Future; Expected date: 02/18/2025  -     Hepatitis C Antibody; Future; Expected date: 02/18/2025  -     HIV 1/2 Ag/Ab (4th Gen); Future; Expected date: 02/18/2025    Prediabetes  -     Hemoglobin A1C; Future; Expected date: 02/18/2025    Chronic pansinusitis    Nasal obstruction    JAVED (obstructive sleep apnea)  -     Ambulatory referral/consult to Sleep Disorders; Future; Expected date: 02/25/2025    Erectile dysfunction, unspecified erectile dysfunction type  -    "  sildenafiL (VIAGRA) 25 MG tablet; Take 1 tablet (25 mg total) by mouth daily as needed for Erectile Dysfunction.  Dispense: 30 tablet; Refill: 0    Shoulder tendonitis, right    Morbid obesity with BMI of 40.0-44.9, adult    Primary hypertension    Gastroesophageal reflux disease without esophagitis    Other orders  -     valsartan (DIOVAN) 160 MG tablet; Take 1 tablet (160 mg total) by mouth once daily.  Dispense: 90 tablet; Refill: 3         1. Annual physical exam    2. Prediabetes    3. Chronic pansinusitis    4. Nasal obstruction    5. JAVED (obstructive sleep apnea)    6. Erectile dysfunction, unspecified erectile dysfunction type    7. Shoulder tendonitis, right    8. Morbid obesity with BMI of 40.0-44.9, adult    9. Primary hypertension    10. Gastroesophageal reflux disease without esophagitis        Fasting labs ordered. Flu vaccine deferred by patient today.  Repeat A1c ordered.  Continue home meds, 1000mg daily.  Discussed importance of diet and exercise.  3/4.  Stable, follows with ENT closely.  S/p septoplasty and polypectomy.  5.  Previously seen by sleep medicine and started process of CPAP machine however did not obtain.  Sleep medicine referral placed.  6.  Stable, trial of sildenafil.  Discussed potential underlying anxiety/depression after recent divorce, no SI/HI.  Still has interest in hobbies.  Normal mood today.  7.  Pain illicited on internal rotation of right shoulder.  Discussed conservative measurements with OTC meds, heating pads, and PT.  Deferring PT at this time.  If persistent or worsening symptoms, consider MRI vs ortho referral.  8.  Discussed importance of diet and exercise.  Offered bariatric medicine, declined today.  9.  Elevated BP today, asymptomatic.  Start valsartan.  Strict RTC/ED precautions given.  Low salt diet.  Instructed to keep BP log at home and follow up in clinic closely.  10.  Stable.  Avoid triggering foods and continue OTC meds      RTC in 1 month or sooner if  needed    Stanislav Reyes MD  [facility]    This note was generated with the assistance of ambient listening technology. Verbal consent was obtained by the patient and accompanying visitor(s) for the recording of patient appointment to facilitate this note. I attest to having reviewed and edited the generated note for accuracy, though some syntax or spelling errors may persist. Please contact the author of this note for any clarification.

## 2025-04-22 ENCOUNTER — PATIENT MESSAGE (OUTPATIENT)
Dept: INTERNAL MEDICINE | Facility: CLINIC | Age: 45
End: 2025-04-22
Payer: COMMERCIAL

## 2025-07-16 ENCOUNTER — PATIENT OUTREACH (OUTPATIENT)
Dept: ADMINISTRATIVE | Facility: HOSPITAL | Age: 45
End: 2025-07-16
Payer: COMMERCIAL

## 2025-07-16 DIAGNOSIS — Z12.11 SCREENING FOR COLORECTAL CANCER: Primary | ICD-10-CM

## 2025-07-16 DIAGNOSIS — Z12.12 SCREENING FOR COLORECTAL CANCER: Primary | ICD-10-CM

## 2025-07-23 ENCOUNTER — CLINICAL SUPPORT (OUTPATIENT)
Dept: ENDOSCOPY | Facility: HOSPITAL | Age: 45
End: 2025-07-23
Payer: COMMERCIAL

## 2025-07-23 DIAGNOSIS — Z12.12 SCREENING FOR COLORECTAL CANCER: ICD-10-CM

## 2025-07-23 DIAGNOSIS — Z12.11 SCREENING FOR COLORECTAL CANCER: ICD-10-CM

## (undated) DEVICE — SUT PROLENE 3-0 SH DA 36 BL

## (undated) DEVICE — SOL IRR SOD CHL .9% POUR

## (undated) DEVICE — TRACKER ENT INSTRUMENT

## (undated) DEVICE — NDL HYPO 27G X 1 1/2

## (undated) DEVICE — DRAPE ORTH SPLIT 77X108IN

## (undated) DEVICE — SUT 5/0 18IN PLAIN FAST AB

## (undated) DEVICE — NDL SPINAL 25GX3.5 SPINOCAN

## (undated) DEVICE — BLADE QUADCUT STRAIGHT 4.3MM

## (undated) DEVICE — SPONGE PATTY SURGICAL .5X3IN

## (undated) DEVICE — KIT ANTIFOG

## (undated) DEVICE — PACK BASIC SETUP SC BR

## (undated) DEVICE — COVER CAMERA OPERATING ROOM

## (undated) DEVICE — GLOVE SURGICAL LATEX SZ 8

## (undated) DEVICE — SYR B-D DISP CONTROL 10CC100/C

## (undated) DEVICE — STRIP MEDI WND CLSR 1/2X4IN

## (undated) DEVICE — ELECTRODE REM PLYHSV RETURN 9

## (undated) DEVICE — SUPPORT ULNA NERVE PROTECTOR

## (undated) DEVICE — JELLY LUBRICATING STERILE 5 GR

## (undated) DEVICE — KIT TURNOVER

## (undated) DEVICE — TRACKER PATIENT NON INVASIVE

## (undated) DEVICE — SYR 10CC LUER LOCK

## (undated) DEVICE — SUT PROLENE 3-0 PS-2 BL 18IN

## (undated) DEVICE — COVER LIGHT HANDLE 80/CA

## (undated) DEVICE — TOWEL OR DISP STRL BLUE 4/PK

## (undated) DEVICE — Device

## (undated) DEVICE — MANIFOLD 4 PORT

## (undated) DEVICE — BLADE TRICUT ROTATABLE 4MM

## (undated) DEVICE — GOWN SMARTGOWN LVL4 X-LONG XL

## (undated) DEVICE — SPLINT INTRANASAL POSISEP .6X2